# Patient Record
Sex: FEMALE | Race: WHITE | NOT HISPANIC OR LATINO
[De-identification: names, ages, dates, MRNs, and addresses within clinical notes are randomized per-mention and may not be internally consistent; named-entity substitution may affect disease eponyms.]

---

## 2017-03-28 PROBLEM — Z00.00 ENCOUNTER FOR PREVENTIVE HEALTH EXAMINATION: Status: ACTIVE | Noted: 2017-03-28

## 2018-06-28 ENCOUNTER — APPOINTMENT (OUTPATIENT)
Dept: ORTHOPEDIC SURGERY | Facility: CLINIC | Age: 83
End: 2018-06-28
Payer: MEDICARE

## 2018-06-28 VITALS — WEIGHT: 152 LBS | HEIGHT: 64 IN | BODY MASS INDEX: 25.95 KG/M2

## 2018-06-28 PROCEDURE — 99203 OFFICE O/P NEW LOW 30 MIN: CPT

## 2018-06-28 PROCEDURE — 73564 X-RAY EXAM KNEE 4 OR MORE: CPT | Mod: RT

## 2018-07-12 ENCOUNTER — APPOINTMENT (OUTPATIENT)
Dept: ORTHOPEDIC SURGERY | Facility: CLINIC | Age: 83
End: 2018-07-12
Payer: MEDICARE

## 2018-07-12 PROCEDURE — 99214 OFFICE O/P EST MOD 30 MIN: CPT

## 2018-07-12 PROCEDURE — 73564 X-RAY EXAM KNEE 4 OR MORE: CPT | Mod: RT

## 2018-08-06 ENCOUNTER — APPOINTMENT (OUTPATIENT)
Dept: ORTHOPEDIC SURGERY | Facility: CLINIC | Age: 83
End: 2018-08-06
Payer: MEDICARE

## 2018-08-06 PROCEDURE — 99214 OFFICE O/P EST MOD 30 MIN: CPT

## 2018-08-06 PROCEDURE — 73564 X-RAY EXAM KNEE 4 OR MORE: CPT | Mod: RT

## 2018-08-20 ENCOUNTER — APPOINTMENT (OUTPATIENT)
Dept: ORTHOPEDIC SURGERY | Facility: CLINIC | Age: 83
End: 2018-08-20
Payer: MEDICARE

## 2018-08-20 PROCEDURE — 99214 OFFICE O/P EST MOD 30 MIN: CPT

## 2018-08-20 PROCEDURE — 73564 X-RAY EXAM KNEE 4 OR MORE: CPT | Mod: RT

## 2018-10-01 ENCOUNTER — APPOINTMENT (OUTPATIENT)
Dept: ORTHOPEDIC SURGERY | Facility: CLINIC | Age: 83
End: 2018-10-01
Payer: MEDICARE

## 2018-10-01 DIAGNOSIS — Z78.9 OTHER SPECIFIED HEALTH STATUS: ICD-10-CM

## 2018-10-01 DIAGNOSIS — S82.001A UNSPECIFIED FRACTURE OF RIGHT PATELLA, INITIAL ENCOUNTER FOR CLOSED FRACTURE: ICD-10-CM

## 2018-10-01 PROCEDURE — 73564 X-RAY EXAM KNEE 4 OR MORE: CPT | Mod: RT

## 2018-10-01 PROCEDURE — 99214 OFFICE O/P EST MOD 30 MIN: CPT

## 2019-01-07 ENCOUNTER — APPOINTMENT (OUTPATIENT)
Dept: ORTHOPEDIC SURGERY | Facility: CLINIC | Age: 84
End: 2019-01-07

## 2019-07-15 ENCOUNTER — APPOINTMENT (OUTPATIENT)
Dept: ORTHOPEDIC SURGERY | Facility: CLINIC | Age: 84
End: 2019-07-15
Payer: MEDICARE

## 2019-07-15 VITALS — DIASTOLIC BLOOD PRESSURE: 79 MMHG | HEART RATE: 89 BPM | SYSTOLIC BLOOD PRESSURE: 126 MMHG

## 2019-07-15 DIAGNOSIS — M25.562 PAIN IN RIGHT KNEE: ICD-10-CM

## 2019-07-15 DIAGNOSIS — G89.29 PAIN IN RIGHT KNEE: ICD-10-CM

## 2019-07-15 DIAGNOSIS — M25.561 PAIN IN RIGHT KNEE: ICD-10-CM

## 2019-07-15 PROCEDURE — 20610 DRAIN/INJ JOINT/BURSA W/O US: CPT | Mod: 50

## 2019-07-15 PROCEDURE — 99214 OFFICE O/P EST MOD 30 MIN: CPT | Mod: 25

## 2019-07-15 PROCEDURE — 73564 X-RAY EXAM KNEE 4 OR MORE: CPT | Mod: 50

## 2019-07-15 NOTE — HISTORY OF PRESENT ILLNESS
[de-identified] : This is very nice 85 year year-old woman experiencing bilateral knee pain, right knee pain started with a fall in June 2018 when she sustained a paterllar fx, the left knee pain started 3 months ago.  Her pain is severe in intensity.  The pain is exacerbated by getting up from sitting or laying down and taking stairs.  She has not been taking any pain medications.  She has been tried using a Brace with minimal relief.  She has tried physical therapy without relief.  She has not been using an assistive device.  She has not had a prior injection in the knees.  She feels clicking.  She states that she feels the leg might give way.  The pain substantially limits activities of daily living. Walking tolerance is reduced. \par \par

## 2019-07-15 NOTE — PHYSICAL EXAM
[de-identified] : Patient is well nourished, well-developed, in no acute distress, with appropriate mood and affect. The patient is oriented to time, place, and person. Respirations are even and unlabored. Gait evaluation does reveal a limp. There is no inguinal adenopathy. Bilateral limbs are well-perfused, without skin lesions, shows a grossly normal motor and sensory examination. The right knee motion is significantly reduced and does cause significant pain. The right knee moves from 10 -125 degrees. The knee is stable within that range-of-motion to AP and ML stress. The alignment of the knee is 5 degrees valgus. Muscle strength is normal. Pedal pulses are palpable. Hip examination was negative. The left knee motion is significantly reduced and does cause significant pain. The left knee moves from 5 -125 degrees. The knee is stable within that range-of-motion to AP and ML stress. The alignment of the knee is 5 degrees valgus. Muscle strength is normal. Pedal pulses are palpable. Hip examination was negative. [de-identified] : Long standing knee, AP knee, lateral knee, and patellar views of the bilateral knee were ordered and taken in the office and demonstrate degenerative joint disease of the knee with joint space narrowing, osteophyte formation, and subchondral sclerosis.

## 2019-07-15 NOTE — DISCUSSION/SUMMARY
[de-identified] : This patient has bilateral knee osteoarthritis particularly in the patellofemoral compartment.  The patient is not an appropriate candidate for total knee arthroplasty at this time. An extensive discussion was conducted on the natural history of the disease and the variety of surgical and non-surgical options available to the patient including, but not limited to non-steroidal anti-inflammatory medications, steroid injections, physical therapy, maintenance of ideal body weight, and reduction of activity.  Today we performed bilateral knee intra-articular cortisone injections.  I recommended and prescribed a course of Mobic which she refused and physical therapy.  The patient is also encouraged to trial a neoprene sleeve knee brace which can be purchased OTC.  The patient will schedule an appointment as needed.\par \par Informed consent for the bilateral knee injection was obtained. All questions were answered. A time out was performed. The bilateral knee was prepped and draped in sterile fashion. Using sterile technique, the bilateral knee was injection of 1 cc of Kenalog, 4cc of 1% lidocaine, 2cc of 0.5% marcaine using a 21-gauge needle. A sterile dressing was applied. Post injection instructions were reviewed. The patient tolerated the procedure well.\par

## 2020-06-30 ENCOUNTER — NON-APPOINTMENT (OUTPATIENT)
Age: 85
End: 2020-06-30

## 2020-06-30 ENCOUNTER — APPOINTMENT (OUTPATIENT)
Dept: CARDIOLOGY | Facility: CLINIC | Age: 85
End: 2020-06-30
Payer: MEDICARE

## 2020-06-30 VITALS
HEART RATE: 68 BPM | HEIGHT: 64 IN | OXYGEN SATURATION: 98 % | BODY MASS INDEX: 26.63 KG/M2 | SYSTOLIC BLOOD PRESSURE: 130 MMHG | WEIGHT: 156 LBS | TEMPERATURE: 96.6 F | DIASTOLIC BLOOD PRESSURE: 80 MMHG

## 2020-06-30 DIAGNOSIS — Z86.39 PERSONAL HISTORY OF OTHER ENDOCRINE, NUTRITIONAL AND METABOLIC DISEASE: ICD-10-CM

## 2020-06-30 PROCEDURE — 93000 ELECTROCARDIOGRAM COMPLETE: CPT

## 2020-06-30 PROCEDURE — 93880 EXTRACRANIAL BILAT STUDY: CPT

## 2020-06-30 PROCEDURE — 93306 TTE W/DOPPLER COMPLETE: CPT

## 2020-06-30 PROCEDURE — 99205 OFFICE O/P NEW HI 60 MIN: CPT

## 2020-06-30 RX ORDER — SIMVASTATIN 80 MG/1
TABLET, FILM COATED ORAL
Refills: 0 | Status: DISCONTINUED | COMMUNITY
End: 2020-06-30

## 2020-06-30 RX ORDER — INSULIN GLARGINE 100 [IU]/ML
INJECTION, SOLUTION SUBCUTANEOUS
Refills: 0 | Status: ACTIVE | COMMUNITY

## 2020-06-30 RX ORDER — INSULIN GLARGINE 100 [IU]/ML
INJECTION, SOLUTION SUBCUTANEOUS
Refills: 0 | Status: DISCONTINUED | COMMUNITY
End: 2020-06-30

## 2020-06-30 RX ORDER — METOPROLOL SUCCINATE 50 MG/1
50 TABLET, EXTENDED RELEASE ORAL DAILY
Qty: 1 | Refills: 3 | Status: ACTIVE | COMMUNITY
Start: 2020-06-30

## 2020-06-30 NOTE — HISTORY OF PRESENT ILLNESS
[FreeTextEntry1] : She is a pleasant 86-year-old female with type 2 diabetes, Overweight by BMI, hypertension, dyslipidemia and comes to the office for cardiac evaluation. She's been having worsening bilateral ankle and leg edema as well as shortness of breath on exertion plus lightheadedness at times. Seen to have a left bundle branch block on ECG, she denies any current chest pains, palpitations or syncope. She does take her medications faithfully and reports eating healthy.

## 2020-06-30 NOTE — PHYSICAL EXAM
[Well Groomed] : well groomed [Normal Appearance] : normal appearance [General Appearance - Well Developed] : well developed [No Deformities] : no deformities [General Appearance - Well Nourished] : well nourished [General Appearance - In No Acute Distress] : no acute distress [Normal Conjunctiva] : the conjunctiva exhibited no abnormalities [Eyelids - No Xanthelasma] : the eyelids demonstrated no xanthelasmas [Normal Oral Mucosa] : normal oral mucosa [No Oral Pallor] : no oral pallor [No Oral Cyanosis] : no oral cyanosis [Normal Jugular Venous A Waves Present] : normal jugular venous A waves present [Normal Jugular Venous V Waves Present] : normal jugular venous V waves present [No Jugular Venous Douglas A Waves] : no jugular venous douglas A waves [Heart Rate And Rhythm] : heart rate and rhythm were normal [Heart Sounds] : normal S1 and S2 [Murmurs] : no murmurs present [1+] : left 1+ [Respiration, Rhythm And Depth] : normal respiratory rhythm and effort [Exaggerated Use Of Accessory Muscles For Inspiration] : no accessory muscle use [Auscultation Breath Sounds / Voice Sounds] : lungs were clear to auscultation bilaterally [Bowel Sounds] : normal bowel sounds [Abdomen Soft] : soft [Abdomen Tenderness] : non-tender [Abnormal Walk] : normal gait [Gait - Sufficient For Exercise Testing] : the gait was sufficient for exercise testing [Cyanosis, Localized] : no localized cyanosis [Nail Clubbing] : no clubbing of the fingernails [Petechial Hemorrhages (___cm)] : no petechial hemorrhages [Skin Color & Pigmentation] : normal skin color and pigmentation [No Venous Stasis] : no venous stasis [Skin Lesions] : no skin lesions [] : no rash [No Skin Ulcers] : no skin ulcer [No Xanthoma] : no  xanthoma was observed [Oriented To Time, Place, And Person] : oriented to person, place, and time [No Anxiety] : not feeling anxious [Affect] : the affect was normal [Mood] : the mood was normal [Right Carotid Bruit] : no bruit heard over the right carotid [Left Carotid Bruit] : no bruit heard over the left carotid [Bruit] : no bruit heard

## 2020-06-30 NOTE — REASON FOR VISIT
[Initial Evaluation] : an initial evaluation of [Abnormal ECG] : an abnormal ECG [Dizziness] : dizziness [Dyspnea] : dyspnea [Hyperlipidemia] : hyperlipidemia

## 2020-06-30 NOTE — DISCUSSION/SUMMARY
[___ Week(s)] : [unfilled] week(s) [FreeTextEntry3] : echo, carotid sono, pharma nuc stress test [FreeTextEntry1] : She will continue her Cozaar and metoprolol for antihypertensive management along with simvastatin for lipid-lowering plus Lantus glycemic control in the setting of type 2 diabetes. She may add Ecotrin 81 mg daily to her regimen for cardiovascular risk reduction and avoidance of other NSAIDs was further instructed. I'm prescribing Lasix 20 mg daily for the next week or so and then this could be made every other day if necessary for edema reduction and shortness of breath improvement. \par \par To further cardiac risk stratify, I have ordered an echocardiogram to assess LV function and valvular status.To better assess carotid disease burden and serve as a cardiovascular risk marker, especially with the possibility of a left carotid bruit and lightheadedness on exam, I've ordered a carotid bilateral duplex study.To assess underlying coronary disease burden with current cardiovascular disease risk factors and symptoms, I have ordered a lexiscan nuclear perfusion stress test. I recommended a heart healthy lifestyle including a low-saturated fat, low cholesterol diet with improved aerobic physical fitness over time for cardiovascular benefits. We will call the patient with test results and followup with you for general care. Followup in one month or sooner if needed.

## 2020-07-14 PROBLEM — Z86.39 HISTORY OF DIABETES MELLITUS: Status: RESOLVED | Noted: 2018-06-28 | Resolved: 2020-07-14

## 2020-07-21 ENCOUNTER — MED ADMIN CHARGE (OUTPATIENT)
Age: 85
End: 2020-07-21

## 2020-07-21 ENCOUNTER — APPOINTMENT (OUTPATIENT)
Dept: CARDIOLOGY | Facility: CLINIC | Age: 85
End: 2020-07-21
Payer: MEDICARE

## 2020-07-21 PROCEDURE — 78452 HT MUSCLE IMAGE SPECT MULT: CPT

## 2020-07-21 PROCEDURE — 93015 CV STRESS TEST SUPVJ I&R: CPT

## 2020-07-21 PROCEDURE — A9500: CPT

## 2020-07-21 RX ORDER — REGADENOSON 0.08 MG/ML
0.4 INJECTION, SOLUTION INTRAVENOUS
Qty: 1 | Refills: 0 | Status: COMPLETED | OUTPATIENT
Start: 2020-07-21

## 2020-07-21 RX ADMIN — REGADENOSON 4 MG/5ML: 0.08 INJECTION, SOLUTION INTRAVENOUS at 00:00

## 2020-07-28 ENCOUNTER — APPOINTMENT (OUTPATIENT)
Dept: CARDIOLOGY | Facility: CLINIC | Age: 85
End: 2020-07-28

## 2020-08-24 ENCOUNTER — RX CHANGE (OUTPATIENT)
Age: 85
End: 2020-08-24

## 2021-07-03 ENCOUNTER — INPATIENT (INPATIENT)
Facility: HOSPITAL | Age: 86
LOS: 9 days | Discharge: HOME | End: 2021-07-13
Attending: INTERNAL MEDICINE | Admitting: INTERNAL MEDICINE
Payer: MEDICARE

## 2021-07-03 VITALS
OXYGEN SATURATION: 98 % | HEART RATE: 67 BPM | RESPIRATION RATE: 16 BRPM | HEIGHT: 64 IN | SYSTOLIC BLOOD PRESSURE: 192 MMHG | WEIGHT: 154.1 LBS | DIASTOLIC BLOOD PRESSURE: 81 MMHG | TEMPERATURE: 98 F

## 2021-07-03 DIAGNOSIS — I10 ESSENTIAL (PRIMARY) HYPERTENSION: ICD-10-CM

## 2021-07-03 DIAGNOSIS — R26.81 UNSTEADINESS ON FEET: ICD-10-CM

## 2021-07-03 DIAGNOSIS — I44.7 LEFT BUNDLE-BRANCH BLOCK, UNSPECIFIED: ICD-10-CM

## 2021-07-03 DIAGNOSIS — R42 DIZZINESS AND GIDDINESS: ICD-10-CM

## 2021-07-03 LAB
ALBUMIN SERPL ELPH-MCNC: 3.4 G/DL — SIGNIFICANT CHANGE UP (ref 3.3–5)
ALP SERPL-CCNC: 96 U/L — SIGNIFICANT CHANGE UP (ref 40–120)
ALT FLD-CCNC: 21 U/L — SIGNIFICANT CHANGE UP (ref 12–78)
ANION GAP SERPL CALC-SCNC: 4 MMOL/L — LOW (ref 5–17)
AST SERPL-CCNC: 21 U/L — SIGNIFICANT CHANGE UP (ref 15–37)
BASOPHILS # BLD AUTO: 0.05 K/UL — SIGNIFICANT CHANGE UP (ref 0–0.2)
BASOPHILS NFR BLD AUTO: 0.6 % — SIGNIFICANT CHANGE UP (ref 0–2)
BILIRUB SERPL-MCNC: 0.5 MG/DL — SIGNIFICANT CHANGE UP (ref 0.2–1.2)
BUN SERPL-MCNC: 14 MG/DL — SIGNIFICANT CHANGE UP (ref 7–23)
CALCIUM SERPL-MCNC: 8.9 MG/DL — SIGNIFICANT CHANGE UP (ref 8.5–10.1)
CHLORIDE SERPL-SCNC: 107 MMOL/L — SIGNIFICANT CHANGE UP (ref 96–108)
CO2 SERPL-SCNC: 28 MMOL/L — SIGNIFICANT CHANGE UP (ref 22–31)
CREAT SERPL-MCNC: 0.72 MG/DL — SIGNIFICANT CHANGE UP (ref 0.5–1.3)
EOSINOPHIL # BLD AUTO: 0.28 K/UL — SIGNIFICANT CHANGE UP (ref 0–0.5)
EOSINOPHIL NFR BLD AUTO: 3.6 % — SIGNIFICANT CHANGE UP (ref 0–6)
ERYTHROCYTE [SEDIMENTATION RATE] IN BLOOD: 18 MM/HR — SIGNIFICANT CHANGE UP (ref 0–20)
FLUAV AG NPH QL: SIGNIFICANT CHANGE UP
FLUBV AG NPH QL: SIGNIFICANT CHANGE UP
GLUCOSE BLDC GLUCOMTR-MCNC: 149 MG/DL — HIGH (ref 70–99)
GLUCOSE BLDC GLUCOMTR-MCNC: 155 MG/DL — HIGH (ref 70–99)
GLUCOSE SERPL-MCNC: 132 MG/DL — HIGH (ref 70–99)
HCT VFR BLD CALC: 42.3 % — SIGNIFICANT CHANGE UP (ref 34.5–45)
HGB BLD-MCNC: 14.2 G/DL — SIGNIFICANT CHANGE UP (ref 11.5–15.5)
IMM GRANULOCYTES NFR BLD AUTO: 0.3 % — SIGNIFICANT CHANGE UP (ref 0–1.5)
LYMPHOCYTES # BLD AUTO: 1.67 K/UL — SIGNIFICANT CHANGE UP (ref 1–3.3)
LYMPHOCYTES # BLD AUTO: 21.2 % — SIGNIFICANT CHANGE UP (ref 13–44)
MCHC RBC-ENTMCNC: 31.3 PG — SIGNIFICANT CHANGE UP (ref 27–34)
MCHC RBC-ENTMCNC: 33.6 GM/DL — SIGNIFICANT CHANGE UP (ref 32–36)
MCV RBC AUTO: 93.2 FL — SIGNIFICANT CHANGE UP (ref 80–100)
MONOCYTES # BLD AUTO: 0.68 K/UL — SIGNIFICANT CHANGE UP (ref 0–0.9)
MONOCYTES NFR BLD AUTO: 8.6 % — SIGNIFICANT CHANGE UP (ref 2–14)
NEUTROPHILS # BLD AUTO: 5.18 K/UL — SIGNIFICANT CHANGE UP (ref 1.8–7.4)
NEUTROPHILS NFR BLD AUTO: 65.7 % — SIGNIFICANT CHANGE UP (ref 43–77)
NRBC # BLD: 0 /100 WBCS — SIGNIFICANT CHANGE UP (ref 0–0)
NT-PROBNP SERPL-SCNC: 240 PG/ML — SIGNIFICANT CHANGE UP (ref 0–450)
PLATELET # BLD AUTO: 234 K/UL — SIGNIFICANT CHANGE UP (ref 150–400)
POTASSIUM SERPL-MCNC: 4.7 MMOL/L — SIGNIFICANT CHANGE UP (ref 3.5–5.3)
POTASSIUM SERPL-SCNC: 4.7 MMOL/L — SIGNIFICANT CHANGE UP (ref 3.5–5.3)
PROT SERPL-MCNC: 7 GM/DL — SIGNIFICANT CHANGE UP (ref 6–8.3)
RBC # BLD: 4.54 M/UL — SIGNIFICANT CHANGE UP (ref 3.8–5.2)
RBC # FLD: 12.9 % — SIGNIFICANT CHANGE UP (ref 10.3–14.5)
SARS-COV-2 RNA SPEC QL NAA+PROBE: SIGNIFICANT CHANGE UP
SODIUM SERPL-SCNC: 139 MMOL/L — SIGNIFICANT CHANGE UP (ref 135–145)
TROPONIN I SERPL-MCNC: <.015 NG/ML — SIGNIFICANT CHANGE UP (ref 0.01–0.04)
TSH SERPL-MCNC: 2.35 UIU/ML — SIGNIFICANT CHANGE UP (ref 0.36–3.74)
WBC # BLD: 7.88 K/UL — SIGNIFICANT CHANGE UP (ref 3.8–10.5)
WBC # FLD AUTO: 7.88 K/UL — SIGNIFICANT CHANGE UP (ref 3.8–10.5)

## 2021-07-03 PROCEDURE — 70450 CT HEAD/BRAIN W/O DYE: CPT | Mod: 26,MA

## 2021-07-03 PROCEDURE — 71045 X-RAY EXAM CHEST 1 VIEW: CPT | Mod: 26

## 2021-07-03 PROCEDURE — 93010 ELECTROCARDIOGRAM REPORT: CPT

## 2021-07-03 PROCEDURE — 99285 EMERGENCY DEPT VISIT HI MDM: CPT

## 2021-07-03 RX ORDER — HEPARIN SODIUM 5000 [USP'U]/ML
5000 INJECTION INTRAVENOUS; SUBCUTANEOUS EVERY 12 HOURS
Refills: 0 | Status: DISCONTINUED | OUTPATIENT
Start: 2021-07-03 | End: 2021-07-13

## 2021-07-03 RX ORDER — LOSARTAN POTASSIUM 100 MG/1
25 TABLET, FILM COATED ORAL DAILY
Refills: 0 | Status: DISCONTINUED | OUTPATIENT
Start: 2021-07-03 | End: 2021-07-04

## 2021-07-03 RX ORDER — MECLIZINE HCL 12.5 MG
25 TABLET ORAL THREE TIMES A DAY
Refills: 0 | Status: DISCONTINUED | OUTPATIENT
Start: 2021-07-03 | End: 2021-07-13

## 2021-07-03 RX ORDER — MECLIZINE HCL 12.5 MG
25 TABLET ORAL ONCE
Refills: 0 | Status: COMPLETED | OUTPATIENT
Start: 2021-07-03 | End: 2021-07-03

## 2021-07-03 RX ORDER — ONDANSETRON 8 MG/1
4 TABLET, FILM COATED ORAL ONCE
Refills: 0 | Status: COMPLETED | OUTPATIENT
Start: 2021-07-03 | End: 2021-07-03

## 2021-07-03 RX ADMIN — HEPARIN SODIUM 5000 UNIT(S): 5000 INJECTION INTRAVENOUS; SUBCUTANEOUS at 18:19

## 2021-07-03 RX ADMIN — Medication 25 MILLIGRAM(S): at 16:26

## 2021-07-03 RX ADMIN — ONDANSETRON 4 MILLIGRAM(S): 8 TABLET, FILM COATED ORAL at 16:26

## 2021-07-03 RX ADMIN — Medication 25 MILLIGRAM(S): at 21:32

## 2021-07-03 RX ADMIN — LOSARTAN POTASSIUM 25 MILLIGRAM(S): 100 TABLET, FILM COATED ORAL at 18:19

## 2021-07-03 NOTE — H&P ADULT - NSHPLABSRESULTS_GEN_ALL_CORE
LABS:                        14.2   7.88  )-----------( 234      ( 03 Jul 2021 14:08 )             42.3     07-03    139  |  107  |  14  ----------------------------<  132<H>  4.7   |  28  |  0.72    Ca    8.9      03 Jul 2021 14:08    TPro  7.0  /  Alb  3.4  /  TBili  0.5  /  DBili  x   /  AST  21  /  ALT  21  /  AlkPhos  96  07-03        EKG  NSR  LBB

## 2021-07-03 NOTE — ED ADULT NURSE NOTE - BEFAST BALANCE
INR on 2/24 was 1.8    He was told to take 6 mg that night then take 4 mg nightly.    Rechecked on 3/3 and his INR was 3.8    He held for 2 days then was hesitant to take 5 mg nightly as when he was taking 4 mg nightly his INR went up to 3.8.    He only took 4 mg nightly yesterday.    He is not comfortable taking 5 mg. Please advise.                     No

## 2021-07-03 NOTE — ED PROVIDER NOTE - MDM ORDERS SUBMITTED SELECTION
Airway patent, Nasal mucosa clear. Mouth with normal mucosa. Throat has no vesicles, no oropharyngeal exudates and uvula is midline. MM Moist. neck supple. no meningeal signs. Labs/EKG/Imaging Studies/Medications

## 2021-07-03 NOTE — ED PROVIDER NOTE - PHYSICAL EXAMINATION
Gen: no acute distress, well appearing, awake, alert and oriented x 3  Cardiac: regular rate and rhythm, +S1S2  Pulm: Clear to auscultation bilaterally  Abd: soft, nontender, nondistended, no guarding  Back: neg CVA ttp, nontender spine  Extremity: no edema, no deformity, warm and well perfused, FROM all extremities    Neuro: awake, alert, oriented x 3, sensorimotor intact, +dizziness reports with head movement,

## 2021-07-03 NOTE — H&P ADULT - HISTORY OF PRESENT ILLNESS
· HPI Objective Statement: Pt is an 87 year old female w/PMH DM, HTN, HLD, who presents to the ED today for evaluation of dizziness over past several weeks with acute lightheadedness that started last night associated with vomiting and difficulty with ambulation. Denies CP fever chills, cough, injuries or trauma. Has chronic SOB that is unchanged, denies new leg swelling or pain. Denies recent travel. no PSHx, no allergies, full vaccinated for COVID on March 16th.  has  had  symptoms c/w  vertigo  for  8  months  has  seen  ENT  underwent  "maneuvers  "  in  ENT  office  without  improvement.  had  cardiac  eval 10  months   ago  for  evaluation of  manning  w/u  reported  negative  asper pt's  daughter  found  to  have  LBB  on  EKG  insure  if  new

## 2021-07-03 NOTE — H&P ADULT - ASSESSMENT
IMPROVE VTE Individual Risk Assessment    RISK                                                                Points    [  ] Previous VTE                                                  3    [  ] Thrombophilia                                               2    [  ] Lower limb paralysis                                      2        (unable to hold up >15 seconds)      [  ] Current Cancer                                              2         (within 6 months)    [ X ] Immobilization > 24 hrs                                1    [  ] ICU/CCU stay > 24 hours                              1    [ X ] Age > 60                                                      1    IMPROVE VTE Score _____2____    IMPROVE Score 0-1: Low Risk, No VTE prophylaxis required for most patients, encourage ambulation.   IMPROVE Score 2-3: At risk, pharmacologic VTE prophylaxis is indicated for most patients (in the absence of a contraindication)  IMPROVE Score > or = 4: High Risk, pharmacologic VTE prophylaxis is indicated for most patients (in the absence of a contraindication)

## 2021-07-03 NOTE — ED PROVIDER NOTE - CLINICAL SUMMARY MEDICAL DECISION MAKING FREE TEXT BOX
87 year old female with acutely worsened dizziness with nausea, vomit, and difficulty with ambulation. Will check labs, imaging EKG, admit.

## 2021-07-03 NOTE — ED ADULT NURSE NOTE - OBJECTIVE STATEMENT
patient A&Ox3 in no acute distress c/o of dizziness " for weeks " patient denied headache denied chest pain denied difficulty breathing at this time , c/o of abdominal pain N/V patient stated " I'm very upset I have some family problems " c/o of depression denied Si/HI , patient moving all extremities no facial droop clear speech at the time of triage , place on  continue to monitor

## 2021-07-03 NOTE — ED PROVIDER NOTE - OBJECTIVE STATEMENT
Pt is an 87 year old female w/PMH DM, HTN, HLD, who presents to the ED today for evaluation of dizziness over past several weeks with acute lightheadedness that started last night associated with vomiting and difficulty with ambulation. Denies CP fever chills, cough, injuries or trauma. Has chronic SOB that is unchanged, denies new leg swelling or pain. Denies recent travel. Denies hx of PE or DVT. Denies hormone replacement therapy. Not on anticoagulation. PMD Timpone, no PSHx, no allergies, full vaccinated for COVID on March 16th.

## 2021-07-03 NOTE — ED ADULT TRIAGE NOTE - CHIEF COMPLAINT QUOTE
brought by ems for dizziness and high blood pressure .pt is also c/o abdominal and nausea and vomiting .

## 2021-07-03 NOTE — CONSULT NOTE ADULT - ASSESSMENT
Subjective Complaints:      Consult requested by ER doctor:                  Attending:     History of Present Illness:  Chief Complaint/Reason for Admission:  History of Present Illness:  HPI:  · HPI Objective Statement: Pt is an 87 year old female w/PMH DM, HTN, HLD, who presents to the ED today for evaluation of dizziness over past several weeks with acute lightheadedness that started last night associated with vomiting and difficulty with ambulation. Denies CP fever chills, cough, injuries or trauma. Has chronic SOB that is unchanged, denies new leg swelling or pain. Denies recent travel. no PSHx, no allergies, full vaccinated for COVID on March 16th.  has  had  symptoms c/w  vertigo  for  8  months  has  seen  ENT  underwent  "maneuvers  "  in  ENT  office  without  improvement.  had  cardiac  eval 10  months   ago  for  evaluation of  manning  w/u  reported  negative  asper pt's  daughter  found  to  have  LBB  on  EKG  insure  if  new    (03 Jul 2021 17:37)        PAST MEDICAL & SURGICAL HISTORY:  HTN (hypertension)    87yFemale    MEDICATIONS  (STANDING):  heparin SubCutaneous Injection - Peds 5000 Unit(s) SubCutaneous every 12 hours  losartan 25 milliGRAM(s) Oral daily  meclizine 25 milliGRAM(s) Oral three times a day    MEDICATIONS  (PRN):      Allergies    No Known Allergies    Intolerances      FAMILY HISTORY:      REVIEW OF SYSTEMS:  General:  No wt loss, fevers, chills, night sweats  Eyes:  Good vision, no reported pain  ENT:  No sore throat, pain, runny nose, dysphagia  CV:  No pain, palpitatioins, hypo/hypertension  Resp:  No dyspnea, cough, tachypnea, wheezing  GI:  No pain, nausea, vomiting, diarrhea, constipatiion  :  No pain, bleeding, incontinence, nocturia  Muscle:  No pain, weakness  Breast:  No pain, abscess, mass, discharge  Neuro:  No weakness, tingling, memory problems  Psych:  No fatigue, insomnia, mood problems, depression  Endocrine:  No polyuria, polydypsia, cold/heat intolerance  Heme:  No petechiae, ecchymosis, easy bruisability  Skin:  No rash, tattoos, scars, edema      Vital Signs Last 24 Hrs  T(C): 36.9 (03 Jul 2021 20:47), Max: 37.2 (03 Jul 2021 19:55)  T(F): 98.4 (03 Jul 2021 20:47), Max: 98.9 (03 Jul 2021 19:55)  HR: 69 (03 Jul 2021 20:47) (65 - 72)  BP: 158/62 (03 Jul 2021 20:47) (147/67 - 192/81)  BP(mean): --  RR: 16 (03 Jul 2021 20:47) (14 - 17)  SpO2: 95% (03 Jul 2021 20:47) (92% - 98%)    GENERAL PHYSICAL EXAM:  General:  Appears stated age, well-groomed, well-nourished, no distress  HEENT:  NC/AT, patent nares w/ pink mucosa, OP clear w/o lesions, PERRL, EOMI, conjunctivae clear, no thyromegaly, nodules, adenopathy, no JVD  Chest:  Full & symmetric excursion, no increased effort, breath sounds clear  Cardiovascular:  Regular rhythm, S1, S2, no murmur/rub/S3/S4, no carotid/femoral/abdominal bruit, radial/pedal pulses 2+, no edema  Abdomen:  Soft, non-tender, non-distended, normoactive bowel sounds, no HSM  Extremities:  Gait & station:   Digits:   Nails:   Joints, Bones, Muscles:   ROM:   Stability:  Skin:  No rash/erythema/ecchymoses/petechiae/wounds/abscess/warm/dry  Musculoskeletal:  Full ROM in all joints w/o swelling/tenderness/effusion    NEUROLOGICAL EXAM:  HENT:  Normocephalic head; atraumatic head.  Neck supple.  ENT: normal looking.  Mental State:    Alert.  Fully oriented to person, place and date.  Coherent.  Speech clear and intact.  Cooperative.  Responds appropriately.    Cranial Nerves:  II-XII:   Pupils round and reactive to light and accommodation.  Extraocular movements full.  Visual fields full (no homonymous hemianopsia).  Visual acuity wnl.  Facial symmetry intact.  Tongue midline.  Motor Functions:  Moves all extremities.  No pronator drift of UE.  Claps hand well.  Hand  intact bilaterally.  Ambulatory.    Sensory Functions:   Intact to touch and pinprick to face and extremities.    Reflexes:  Deep tendon reflexes normoactive to biceps, knees and ankles.  Babinski absent (present).  Cerebellar Testing:    Finger to nose intact.  Nystagmus absent.  Neurovascular: Carotid auscultation full without bruits.      LABS:                        14.2   7.88  )-----------( 234      ( 03 Jul 2021 14:08 )             42.3     07-03    139  |  107  |  14  ----------------------------<  132<H>  4.7   |  28  |  0.72    Ca    8.9      03 Jul 2021 14:08    TPro  7.0  /  Alb  3.4  /  TBili  0.5  /  DBili  x   /  AST  21  /  ALT  21  /  AlkPhos  96  07-03            RADIOLOGY & ADDITIONAL STUDIES:      Assessment & Opinion: events noted dizziness ct head no acute path  awaek alert sp[eech fluent follows commands arm leg 4/5 sensory intact for mri michelle echo tsh b12 asa 81 mg  will follow     Recommendations:  Brain MRI.  Carotid doppler.  Echocardiogram.  EEG.   DVT prophylaxis as ordered.  Medications:

## 2021-07-04 LAB
COVID-19 SPIKE DOMAIN AB INTERP: POSITIVE
COVID-19 SPIKE DOMAIN ANTIBODY RESULT: >250 U/ML — HIGH
CRP SERPL-MCNC: <3 MG/L — SIGNIFICANT CHANGE UP
SARS-COV-2 IGG+IGM SERPL QL IA: >250 U/ML — HIGH
SARS-COV-2 IGG+IGM SERPL QL IA: POSITIVE
T3 SERPL-MCNC: 112 NG/DL — SIGNIFICANT CHANGE UP (ref 80–200)
T4 AB SER-ACNC: 5.9 UG/DL — SIGNIFICANT CHANGE UP (ref 4.6–12)

## 2021-07-04 PROCEDURE — 99223 1ST HOSP IP/OBS HIGH 75: CPT

## 2021-07-04 RX ORDER — LOSARTAN POTASSIUM 100 MG/1
25 TABLET, FILM COATED ORAL ONCE
Refills: 0 | Status: COMPLETED | OUTPATIENT
Start: 2021-07-04 | End: 2021-07-04

## 2021-07-04 RX ORDER — LOSARTAN POTASSIUM 100 MG/1
50 TABLET, FILM COATED ORAL DAILY
Refills: 0 | Status: DISCONTINUED | OUTPATIENT
Start: 2021-07-05 | End: 2021-07-05

## 2021-07-04 RX ADMIN — HEPARIN SODIUM 5000 UNIT(S): 5000 INJECTION INTRAVENOUS; SUBCUTANEOUS at 05:46

## 2021-07-04 RX ADMIN — LOSARTAN POTASSIUM 25 MILLIGRAM(S): 100 TABLET, FILM COATED ORAL at 20:11

## 2021-07-04 RX ADMIN — Medication 25 MILLIGRAM(S): at 05:45

## 2021-07-04 RX ADMIN — HEPARIN SODIUM 5000 UNIT(S): 5000 INJECTION INTRAVENOUS; SUBCUTANEOUS at 17:27

## 2021-07-04 RX ADMIN — Medication 25 MILLIGRAM(S): at 21:22

## 2021-07-04 RX ADMIN — LOSARTAN POTASSIUM 25 MILLIGRAM(S): 100 TABLET, FILM COATED ORAL at 05:45

## 2021-07-04 RX ADMIN — Medication 25 MILLIGRAM(S): at 13:46

## 2021-07-04 NOTE — CONSULT NOTE ADULT - SUBJECTIVE AND OBJECTIVE BOX
Cardiology Initial Consult    [facility] - Cardiology at [place]  [address] [place] [address], [place] [address]  Office: [phone]  Fax: [phone]    CHIEF COMPLAINT:      HISTORY OF PRESENT ILLNESS:  87yFemale    Allergies    No Known Allergies    Intolerances    	    MEDICATIONS:  heparin SubCutaneous Injection - Peds 5000 Unit(s) SubCutaneous every 12 hours  losartan 25 milliGRAM(s) Oral daily        meclizine 25 milliGRAM(s) Oral three times a day            PAST MEDICAL & SURGICAL HISTORY:  HTN (hypertension)    Diabetes        FAMILY HISTORY:      SOCIAL HISTORY:    [ ] Non-smoker  [ ] Smoker  [ ] Alcohol    Review of Systems:  Constitutional: [ ] Fever [ ] Chills [ ] Fatigue [ ] Weight change   HEENT: [ ] Blurred vision [ ] Eye pain [ ] Headache [ ] Runny nose [ ] Sore throat   Respiratory: [ ] Cough [ ] Wheezing [ ] Shortness of breath  Cardiovascular: [ ] Chest Pain [ ] Palpitations [ ] WESTON [ ] PND [ ] Orthopnea  Gastrointestinal: [ ] Abdominal Pain [ ] Diarrhea [ ] Constipation [ ] Hemorrhoids [ ] Nausea [ ] Vomiting  Genitourinary: [ ] Nocturia [ ] Dysuria [ ] Incontinence  Extremities: [ ] Swelling [ ] Joint Pain  Neurologic: [ ] Focal deficit [ ] Paresthesias [ ] Syncope  Skin: [ ] Rash [ ] Ecchymoses [ ] Wounds [ ] Lesions  Psychiatry: [ ] Depression [ ] Suicidal/Homicidal ideation [ ] Anxiety [ ] Sleep disturbances  [ ] 10 point review of systems is otherwise negative except as mentioned above            [ ]Unable to obtain    PHYSICAL EXAM:  T(C): 36.4 (07-04-21 @ 05:32), Max: 37.2 (07-03-21 @ 19:55)  HR: 62 (07-04-21 @ 05:32) (62 - 72)  BP: 113/68 (07-04-21 @ 05:32) (113/68 - 192/81)  RR: 18 (07-04-21 @ 05:32) (14 - 18)  SpO2: 95% (07-04-21 @ 05:32) (92% - 98%)  Wt(kg): --  I&O's Summary      Appearance: No acute distress  HEENT:   Normal oral mucosa, PERRL  Cardiovascular: Normal S1 S2, no elevated JVP, no murmurs, no edema  Respiratory: Lungs clear to auscultation	, good air movement  Psychiatry: A & O x 3, Mood & affect appropriate  Gastrointestinal:  soft nt nd normoactive bs	  Skin: No rashes, no ecchymoses, no cyanosis	  Neurologic: grossly non-focal  Extremities: Normal range of motion, no clubbing, cyanosis or edema  Vascular: Peripheral pulses palpable bilaterally    LABS:	 	  CBC Full  -  ( 03 Jul 2021 14:08 )  WBC Count : 7.88 K/uL  Hemoglobin : 14.2 g/dL  Hematocrit : 42.3 %  Platelet Count - Automated : 234 K/uL  Mean Cell Volume : 93.2 fl  Mean Cell Hemoglobin : 31.3 pg  Mean Cell Hemoglobin Concentration : 33.6 gm/dL  Auto Neutrophil # : 5.18 K/uL  Auto Lymphocyte # : 1.67 K/uL  Auto Monocyte # : 0.68 K/uL  Auto Eosinophil # : 0.28 K/uL  Auto Basophil # : 0.05 K/uL  Auto Neutrophil % : 65.7 %  Auto Lymphocyte % : 21.2 %  Auto Monocyte % : 8.6 %  Auto Eosinophil % : 3.6 %  Auto Basophil % : 0.6 %    07-03    139  |  107  |  14  ----------------------------<  132<H>  4.7   |  28  |  0.72    Ca    8.9      03 Jul 2021 14:08    TPro  7.0  /  Alb  3.4  /  TBili  0.5  /  DBili  x   /  AST  21  /  ALT  21  /  AlkPhos  96  07-03      proBNP: Serum Pro-Brain Natriuretic Peptide: 240 pg/mL (07-03 @ 14:08)    Lipid Profile:   HgA1c:   TSH: Thyroid Stimulating Hormone, Serum: 2.350 uIU/mL (07-03 @ 20:07)      CARDIAC MARKERS:  Troponin I, Serum: <.015 ng/mL (07-03 @ 14:08)              TELEMETRY: 	    ECG:  	  RADIOLOGY:  OTHER: 	    PREVIOUS DIAGNOSTIC TESTING:    [ ] Echocardiogram:   [ ] Catheterization:   [ ] Stress Test:  	 Cardiology Initial Consult    [facility] - Cardiology at [place]  [address] [place] [address], [place] [address]  Office: [phone]  Fax: [phone]    CHIEF COMPLAINT:  dizziness    HISTORY OF PRESENT ILLNESS:  Cardiologist: Dr. Dany Grace  87F HTN, HLD, DM who presented with several weeks of dizziness with acute episode of lightheadedness associated with vomiting and difficulty ambulating. Chronic SOB which has been documented in the past and has not changed in character.          Allergies  No Known Allergies  Intolerances    	  MEDICATIONS:  heparin SubCutaneous Injection - Peds 5000 Unit(s) SubCutaneous every 12 hours  losartan 25 milliGRAM(s) Oral daily  meclizine 25 milliGRAM(s) Oral three times a day    PAST MEDICAL & SURGICAL HISTORY:  HTN (hypertension)  Diabetes      FAMILY HISTORY:      SOCIAL HISTORY:    [x] Non-smoker  [ ] Smoker  [ ] Alcohol    Review of Systems:  Constitutional: [ ] Fever [ ] Chills [ ] Fatigue [ ] Weight change   HEENT: [ ] Blurred vision [ ] Eye pain [ ] Headache [ ] Runny nose [ ] Sore throat   Respiratory: [ ] Cough [ ] Wheezing [ ] Shortness of breath  Cardiovascular: [ ] Chest Pain [ ] Palpitations [ ] WESTON [ ] PND [ ] Orthopnea  Gastrointestinal: [ ] Abdominal Pain [ ] Diarrhea [ ] Constipation [ ] Hemorrhoids [ ] Nausea [ ] Vomiting  Genitourinary: [ ] Nocturia [ ] Dysuria [ ] Incontinence  Extremities: [ ] Swelling [ ] Joint Pain  Neurologic: [ ] Focal deficit [ ] Paresthesias [ ] Syncope  Skin: [ ] Rash [ ] Ecchymoses [ ] Wounds [ ] Lesions  Psychiatry: [ ] Depression [ ] Suicidal/Homicidal ideation [ ] Anxiety [ ] Sleep disturbances  [ ] 10 point review of systems is otherwise negative except as mentioned above            [ ]Unable to obtain    PHYSICAL EXAM:  T(C): 36.4 (07-04-21 @ 05:32), Max: 37.2 (07-03-21 @ 19:55)  HR: 62 (07-04-21 @ 05:32) (62 - 72)  BP: 113/68 (07-04-21 @ 05:32) (113/68 - 192/81)  RR: 18 (07-04-21 @ 05:32) (14 - 18)  SpO2: 95% (07-04-21 @ 05:32) (92% - 98%)  Wt(kg): --  I&O's Summary      Appearance: No acute distress  HEENT:   Normal oral mucosa, PERRL  Cardiovascular: Normal S1 S2, no elevated JVP, no murmurs, no edema  Respiratory: Lungs clear to auscultation	, good air movement  Psychiatry: A & O x 3, Mood & affect appropriate  Gastrointestinal:  soft nt nd normoactive bs	  Skin: No rashes, no ecchymoses, no cyanosis	  Neurologic: grossly non-focal  Extremities: Normal range of motion, no clubbing, cyanosis or edema  Vascular: Peripheral pulses palpable bilaterally    LABS:	 	  CBC Full  -  ( 03 Jul 2021 14:08 )  WBC Count : 7.88 K/uL  Hemoglobin : 14.2 g/dL  Hematocrit : 42.3 %  Platelet Count - Automated : 234 K/uL    07-03  139  |  107  |  14  ----------------------------<  132<H>  4.7   |  28  |  0.72    Ca    8.9      03 Jul 2021 14:08    TPro  7.0  /  Alb  3.4  /  TBili  0.5  /  DBili  x   /  AST  21  /  ALT  21  /  AlkPhos  96  07-03  proBNP: Serum Pro-Brain Natriuretic Peptide: 240 pg/mL (07-03 @ 14:08)    Lipid Profile:   HgA1c:   TSH: Thyroid Stimulating Hormone, Serum: 2.350 uIU/mL (07-03 @ 20:07)    CARDIAC MARKERS:  Troponin I, Serum: <.015 ng/mL (07-03 @ 14:08)      TELEMETRY: 	    ECG:  	  RADIOLOGY:  6/30/2020: carotid duplex without hemodynamically significant stenosis  OTHER: 	    PREVIOUS DIAGNOSTIC TESTING:    [x] Echocardiogram: 6/30/2020:  EF 65%, DD1, mildly restrictive MV leaflets  [ ] Catheterization:   [x] Stress Test:  	7/21/2020: normal MPI with vasodilator stress testing Cardiology Initial Consult    [facility] - Cardiology at [place]  [address] [place] [address], [place] [address]  Office: [phone]  Fax: [phone]    CHIEF COMPLAINT:  dizziness    HISTORY OF PRESENT ILLNESS:  Cardiologist: Dr. Dany Grace  87F HTN, HLD, DM who presented with several weeks of dizziness with acute episode of lightheadedness associated with vomiting and difficulty ambulating. Chronic SOB which has been documented in the past and has not changed in character.    Denies any chest pain, orthopnea, new LE edema,          Allergies  No Known Allergies  Intolerances    	  MEDICATIONS:  heparin SubCutaneous Injection - Peds 5000 Unit(s) SubCutaneous every 12 hours  losartan 25 milliGRAM(s) Oral daily  meclizine 25 milliGRAM(s) Oral three times a day    PAST MEDICAL & SURGICAL HISTORY:  HTN (hypertension)  Diabetes    FAMILY HISTORY:    SOCIAL HISTORY:    [x] Non-smoker  [ ] Smoker  [ ] Alcohol    Review of Systems:  Constitutional: [- ] Fever [- ] Chills [ ] Fatigue [ ] Weight change   HEENT: [ ] Blurred vision [- ] Eye pain [ ] Headache [ ] Runny nose [ ] Sore throat   Respiratory: [ ] Cough [ ] Wheezing [ -] Shortness of breath  Cardiovascular: [- ] Chest Pain [ -] Palpitations [ ] WESTON [ ] PND [ ] Orthopnea  Gastrointestinal: [- ] Abdominal Pain [ ] Diarrhea [ ] Constipation [ ] Hemorrhoids [ x] Nausea [x ] Vomiting  Genitourinary: [ ] Nocturia [- ] Dysuria [ ] Incontinence  Extremities: [- ] Swelling [ ] Joint Pain  Neurologic: [- ] Focal deficit [ ] Paresthesias [ ] Syncope  Skin: [- ] Rash [ ] Ecchymoses [ ] Wounds [ ] Lesions  Psychiatry: [ -] Depression [ ] Suicidal/Homicidal ideation [ ] Anxiety [ ] Sleep disturbances  [x ] 10 point review of systems is otherwise negative except as mentioned above            [ ]Unable to obtain    PHYSICAL EXAM:  T(C): 36.4 (07-04-21 @ 05:32), Max: 37.2 (07-03-21 @ 19:55)  HR: 62 (07-04-21 @ 05:32) (62 - 72)  BP: 113/68 (07-04-21 @ 05:32) (113/68 - 192/81)  RR: 18 (07-04-21 @ 05:32) (14 - 18)  SpO2: 95% (07-04-21 @ 05:32) (92% - 98%)  Wt(kg): --  I&O's Summary      Appearance: No acute distress  HEENT:   Normal oral mucosa, PERRL  Cardiovascular: Normal S1 S2, no elevated JVP, no murmurs, no edema  Respiratory: Lungs clear to auscultation	, good air movement  Psychiatry: A & O x 3, Mood & affect appropriate  Gastrointestinal:  soft nt nd normoactive bs	  Skin: No rashes, no ecchymoses, no cyanosis	  Neurologic: grossly non-focal  Vascular: Peripheral pulses palpable bilaterally    LABS:	 	  CBC Full  -  ( 03 Jul 2021 14:08 )  WBC Count : 7.88 K/uL  Hemoglobin : 14.2 g/dL  Hematocrit : 42.3 %  Platelet Count - Automated : 234 K/uL    07-03  139  |  107  |  14  ----------------------------<  132<H>  4.7   |  28  |  0.72    Ca    8.9      03 Jul 2021 14:08    TPro  7.0  /  Alb  3.4  /  TBili  0.5  /  DBili  x   /  AST  21  /  ALT  21  /  AlkPhos  96  07-03  proBNP: Serum Pro-Brain Natriuretic Peptide: 240 pg/mL (07-03 @ 14:08)    Lipid Profile:   HgA1c:   TSH: Thyroid Stimulating Hormone, Serum: 2.350 uIU/mL (07-03 @ 20:07)    CARDIAC MARKERS:  Troponin I, Serum: <.015 ng/mL (07-03 @ 14:08)    ECG:  	SR, LBBB  RADIOLOGY:  6/30/2020: carotid duplex without hemodynamically significant stenosis  OTHER: 	    PREVIOUS DIAGNOSTIC TESTING:    [x] Echocardiogram: 6/30/2020:  EF 65%, DD1, mildly restrictive MV leaflets  [ ] Catheterization:   [x] Stress Test:  	7/21/2020: normal MPI with vasodilator stress testing

## 2021-07-04 NOTE — PHYSICAL THERAPY INITIAL EVALUATION ADULT - PERTINENT HX OF CURRENT PROBLEM, REHAB EVAL
This is the hx of REMY. a 86 y/o female patient who [place] [facility] [place] complications of Dizziness affecting medical condition and with subsequent affection on functional mobility.

## 2021-07-04 NOTE — PHYSICAL THERAPY INITIAL EVALUATION ADULT - BALANCE TRAINING, PT EVAL
Pt will increase static/dynamic standing balance to WFL to perform all functional mobility without LOB, by 2-3 weeks

## 2021-07-04 NOTE — CONSULT NOTE ADULT - ASSESSMENT
87F HTN, HLD, DM who presented with several weeks of dizziness with acute episode of lightheadedness associated with vomiting and difficulty ambulating. Chronic SOB which has been documented in the past and has not changed in character. 87F HTN, HLD, DM who presented with several weeks of dizziness with acute episode of lightheadedness associated with vomiting and difficulty ambulating, found to have LBBB.    Upon chart review of outpatient records she had a LBBB in 6/2020 for which evaluation of her chronic symptoms revealed nl LVEF, DD1, and no significant carotid disease.    Symptoms appear to be less likely cardiac in etiology; repeat TTE and carotid duplex to rule-out structural heart/atherosclerotic disease.          Sarabjit Fajardo MD, [facility]  , [employer] [facility] [employer] [facility]  [address], [place] [address]  office: [phone]

## 2021-07-04 NOTE — PHYSICAL THERAPY INITIAL EVALUATION ADULT - CRITERIA FOR SKILLED THERAPEUTIC INTERVENTIONS
subacute rehab/impairments found/functional limitations in following categories/risk reduction/prevention/rehab potential/therapy frequency/predicted duration of therapy intervention/anticipated discharge recommendation

## 2021-07-04 NOTE — PHYSICAL THERAPY INITIAL EVALUATION ADULT - BED MOBILITY TRAINING, PT EVAL
Pt will independently perform all aspects of bed mobility to help prevent pressure ulcers, by 2-3 weeks

## 2021-07-04 NOTE — PHYSICAL THERAPY INITIAL EVALUATION ADULT - GAIT TRAINING, PT EVAL
Independent in ambulation with use of no Device device up to 400 feet observing proper gait pattern, posture and use of walking device safely.

## 2021-07-04 NOTE — PHYSICAL THERAPY INITIAL EVALUATION ADULT - GENERAL OBSERVATIONS, REHAB EVAL
Pt encountered sitting on the edge of the bed. AxOx4, [place]ralt[place]/english speaking patient, cooperative.

## 2021-07-04 NOTE — PHYSICAL THERAPY INITIAL EVALUATION ADULT - DID THE PATIENT HAVE SURGERY?
This is the hx of JANNETTE a 88 y/o female patient who was admitted to [facility] due to complications of Dizziness affecting medical condition and with subsequent affection on functional mobility./n/a

## 2021-07-04 NOTE — PHYSICAL THERAPY INITIAL EVALUATION ADULT - STRENGTHENING, PT EVAL
Improve strength in B LE's to 5/5 and be able to perform functional tasks-bed mobility, sitting, standing, transfers and ambulate in a safe manner with or without  assistive device and prevent falls.

## 2021-07-04 NOTE — PHYSICAL THERAPY INITIAL EVALUATION ADULT - ADDITIONAL COMMENTS
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 pt was independent with no use of AD, still drives a car.

## 2021-07-05 LAB
GLUCOSE BLDC GLUCOMTR-MCNC: 189 MG/DL — HIGH (ref 70–99)
GLUCOSE BLDC GLUCOMTR-MCNC: 197 MG/DL — HIGH (ref 70–99)

## 2021-07-05 PROCEDURE — 70551 MRI BRAIN STEM W/O DYE: CPT | Mod: 26

## 2021-07-05 PROCEDURE — 93880 EXTRACRANIAL BILAT STUDY: CPT | Mod: 26

## 2021-07-05 RX ORDER — DEXTROSE 50 % IN WATER 50 %
12.5 SYRINGE (ML) INTRAVENOUS ONCE
Refills: 0 | Status: DISCONTINUED | OUTPATIENT
Start: 2021-07-05 | End: 2021-07-13

## 2021-07-05 RX ORDER — DEXTROSE 50 % IN WATER 50 %
25 SYRINGE (ML) INTRAVENOUS ONCE
Refills: 0 | Status: DISCONTINUED | OUTPATIENT
Start: 2021-07-05 | End: 2021-07-13

## 2021-07-05 RX ORDER — INSULIN GLARGINE 100 [IU]/ML
20 INJECTION, SOLUTION SUBCUTANEOUS
Qty: 0 | Refills: 0 | DISCHARGE

## 2021-07-05 RX ORDER — GLUCAGON INJECTION, SOLUTION 0.5 MG/.1ML
1 INJECTION, SOLUTION SUBCUTANEOUS ONCE
Refills: 0 | Status: DISCONTINUED | OUTPATIENT
Start: 2021-07-05 | End: 2021-07-13

## 2021-07-05 RX ORDER — SODIUM CHLORIDE 9 MG/ML
1000 INJECTION, SOLUTION INTRAVENOUS
Refills: 0 | Status: DISCONTINUED | OUTPATIENT
Start: 2021-07-05 | End: 2021-07-13

## 2021-07-05 RX ORDER — DEXTROSE 50 % IN WATER 50 %
15 SYRINGE (ML) INTRAVENOUS ONCE
Refills: 0 | Status: DISCONTINUED | OUTPATIENT
Start: 2021-07-05 | End: 2021-07-13

## 2021-07-05 RX ORDER — METOPROLOL TARTRATE 50 MG
15 TABLET ORAL
Qty: 0 | Refills: 0 | DISCHARGE

## 2021-07-05 RX ORDER — INSULIN GLARGINE 100 [IU]/ML
10 INJECTION, SOLUTION SUBCUTANEOUS AT BEDTIME
Refills: 0 | Status: DISCONTINUED | OUTPATIENT
Start: 2021-07-05 | End: 2021-07-13

## 2021-07-05 RX ORDER — LOSARTAN POTASSIUM 100 MG/1
1 TABLET, FILM COATED ORAL
Qty: 0 | Refills: 0 | DISCHARGE

## 2021-07-05 RX ORDER — INSULIN LISPRO 100/ML
VIAL (ML) SUBCUTANEOUS
Refills: 0 | Status: DISCONTINUED | OUTPATIENT
Start: 2021-07-05 | End: 2021-07-13

## 2021-07-05 RX ORDER — ATORVASTATIN CALCIUM 80 MG/1
40 TABLET, FILM COATED ORAL AT BEDTIME
Refills: 0 | Status: DISCONTINUED | OUTPATIENT
Start: 2021-07-05 | End: 2021-07-13

## 2021-07-05 RX ORDER — METOPROLOL TARTRATE 50 MG
12.5 TABLET ORAL
Refills: 0 | Status: DISCONTINUED | OUTPATIENT
Start: 2021-07-05 | End: 2021-07-13

## 2021-07-05 RX ORDER — LOSARTAN POTASSIUM 100 MG/1
25 TABLET, FILM COATED ORAL DAILY
Refills: 0 | Status: DISCONTINUED | OUTPATIENT
Start: 2021-07-05 | End: 2021-07-13

## 2021-07-05 RX ADMIN — HEPARIN SODIUM 5000 UNIT(S): 5000 INJECTION INTRAVENOUS; SUBCUTANEOUS at 05:07

## 2021-07-05 RX ADMIN — Medication 25 MILLIGRAM(S): at 14:04

## 2021-07-05 RX ADMIN — Medication 12.5 MILLIGRAM(S): at 18:09

## 2021-07-05 RX ADMIN — Medication 1: at 15:56

## 2021-07-05 RX ADMIN — LOSARTAN POTASSIUM 50 MILLIGRAM(S): 100 TABLET, FILM COATED ORAL at 05:07

## 2021-07-05 RX ADMIN — ATORVASTATIN CALCIUM 40 MILLIGRAM(S): 80 TABLET, FILM COATED ORAL at 21:17

## 2021-07-05 RX ADMIN — Medication 25 MILLIGRAM(S): at 05:08

## 2021-07-05 RX ADMIN — Medication 30 MILLILITER(S): at 20:21

## 2021-07-05 RX ADMIN — Medication 25 MILLIGRAM(S): at 21:17

## 2021-07-05 RX ADMIN — INSULIN GLARGINE 10 UNIT(S): 100 INJECTION, SOLUTION SUBCUTANEOUS at 21:17

## 2021-07-05 RX ADMIN — LOSARTAN POTASSIUM 25 MILLIGRAM(S): 100 TABLET, FILM COATED ORAL at 18:10

## 2021-07-05 RX ADMIN — HEPARIN SODIUM 5000 UNIT(S): 5000 INJECTION INTRAVENOUS; SUBCUTANEOUS at 18:09

## 2021-07-06 ENCOUNTER — TRANSCRIPTION ENCOUNTER (OUTPATIENT)
Age: 86
End: 2021-07-06

## 2021-07-06 LAB
-  AMIKACIN: SIGNIFICANT CHANGE UP
-  AMOXICILLIN/CLAVULANIC ACID: SIGNIFICANT CHANGE UP
-  AMPICILLIN/SULBACTAM: SIGNIFICANT CHANGE UP
-  AMPICILLIN: SIGNIFICANT CHANGE UP
-  AZTREONAM: SIGNIFICANT CHANGE UP
-  CEFAZOLIN: SIGNIFICANT CHANGE UP
-  CEFEPIME: SIGNIFICANT CHANGE UP
-  CEFOXITIN: SIGNIFICANT CHANGE UP
-  CEFTRIAXONE: SIGNIFICANT CHANGE UP
-  CIPROFLOXACIN: SIGNIFICANT CHANGE UP
-  ERTAPENEM: SIGNIFICANT CHANGE UP
-  GENTAMICIN: SIGNIFICANT CHANGE UP
-  IMIPENEM: SIGNIFICANT CHANGE UP
-  LEVOFLOXACIN: SIGNIFICANT CHANGE UP
-  MEROPENEM: SIGNIFICANT CHANGE UP
-  NITROFURANTOIN: SIGNIFICANT CHANGE UP
-  PIPERACILLIN/TAZOBACTAM: SIGNIFICANT CHANGE UP
-  TIGECYCLINE: SIGNIFICANT CHANGE UP
-  TOBRAMYCIN: SIGNIFICANT CHANGE UP
-  TRIMETHOPRIM/SULFAMETHOXAZOLE: SIGNIFICANT CHANGE UP
A1C WITH ESTIMATED AVERAGE GLUCOSE RESULT: 8.1 % — HIGH (ref 4–5.6)
CULTURE RESULTS: SIGNIFICANT CHANGE UP
ESTIMATED AVERAGE GLUCOSE: 186 MG/DL — HIGH (ref 68–114)
GLUCOSE BLDC GLUCOMTR-MCNC: 134 MG/DL — HIGH (ref 70–99)
GLUCOSE BLDC GLUCOMTR-MCNC: 152 MG/DL — HIGH (ref 70–99)
GLUCOSE BLDC GLUCOMTR-MCNC: 190 MG/DL — HIGH (ref 70–99)
GLUCOSE BLDC GLUCOMTR-MCNC: 272 MG/DL — HIGH (ref 70–99)
GLUCOSE BLDC GLUCOMTR-MCNC: 349 MG/DL — HIGH (ref 70–99)
METHOD TYPE: SIGNIFICANT CHANGE UP
ORGANISM # SPEC MICROSCOPIC CNT: SIGNIFICANT CHANGE UP
ORGANISM # SPEC MICROSCOPIC CNT: SIGNIFICANT CHANGE UP
SPECIMEN SOURCE: SIGNIFICANT CHANGE UP

## 2021-07-06 PROCEDURE — 93306 TTE W/DOPPLER COMPLETE: CPT | Mod: 26

## 2021-07-06 RX ORDER — MECLIZINE HCL 12.5 MG
1 TABLET ORAL
Qty: 0 | Refills: 0 | DISCHARGE
Start: 2021-07-06

## 2021-07-06 RX ORDER — ONDANSETRON 8 MG/1
4 TABLET, FILM COATED ORAL DAILY
Refills: 0 | Status: DISCONTINUED | OUTPATIENT
Start: 2021-07-06 | End: 2021-07-13

## 2021-07-06 RX ORDER — ONDANSETRON 8 MG/1
1 TABLET, FILM COATED ORAL
Qty: 21 | Refills: 0
Start: 2021-07-06 | End: 2021-07-12

## 2021-07-06 RX ORDER — INSULIN GLARGINE 100 [IU]/ML
24 INJECTION, SOLUTION SUBCUTANEOUS
Qty: 0 | Refills: 0 | DISCHARGE

## 2021-07-06 RX ADMIN — Medication 12.5 MILLIGRAM(S): at 05:25

## 2021-07-06 RX ADMIN — LOSARTAN POTASSIUM 25 MILLIGRAM(S): 100 TABLET, FILM COATED ORAL at 05:24

## 2021-07-06 RX ADMIN — Medication 25 MILLIGRAM(S): at 05:25

## 2021-07-06 RX ADMIN — HEPARIN SODIUM 5000 UNIT(S): 5000 INJECTION INTRAVENOUS; SUBCUTANEOUS at 18:17

## 2021-07-06 RX ADMIN — HEPARIN SODIUM 5000 UNIT(S): 5000 INJECTION INTRAVENOUS; SUBCUTANEOUS at 05:21

## 2021-07-06 RX ADMIN — ONDANSETRON 4 MILLIGRAM(S): 8 TABLET, FILM COATED ORAL at 07:57

## 2021-07-06 RX ADMIN — Medication 1: at 07:56

## 2021-07-06 RX ADMIN — ATORVASTATIN CALCIUM 40 MILLIGRAM(S): 80 TABLET, FILM COATED ORAL at 21:19

## 2021-07-06 RX ADMIN — Medication 4: at 11:11

## 2021-07-06 RX ADMIN — Medication 25 MILLIGRAM(S): at 13:49

## 2021-07-06 RX ADMIN — Medication 12.5 MILLIGRAM(S): at 18:17

## 2021-07-06 RX ADMIN — Medication 25 MILLIGRAM(S): at 21:19

## 2021-07-06 RX ADMIN — INSULIN GLARGINE 10 UNIT(S): 100 INJECTION, SOLUTION SUBCUTANEOUS at 22:06

## 2021-07-06 NOTE — DISCHARGE NOTE PROVIDER - CARE PROVIDERS DIRECT ADDRESSES
[email or web address],[email or web address],DirectAddress_Unknown

## 2021-07-06 NOTE — DISCHARGE NOTE PROVIDER - NSDCCPCAREPLAN_GEN_ALL_CORE_FT
PRINCIPAL DISCHARGE DIAGNOSIS  Diagnosis: Severe vertigo  Assessment and Plan of Treatment:       SECONDARY DISCHARGE DIAGNOSES  Diagnosis: Dizziness  Assessment and Plan of Treatment: Dizziness

## 2021-07-06 NOTE — DISCHARGE NOTE PROVIDER - CARE PROVIDER_API CALL
Francesco [place] [address]  Phone: [phone]  Fax: [phone]  Follow Up Time:     Amber Lowe  NEUROLOGY  [facility], [place] [id number]  Phone: [phone]  Fax: [phone]  Follow Up Time:     ENT,   Phone: (   )    -  Fax: (   )    -  Follow Up Time:

## 2021-07-06 NOTE — DISCHARGE NOTE PROVIDER - NSDCMRMEDTOKEN_GEN_ALL_CORE_FT
atorvastatin 40 mg oral tablet: 1 tab(s) orally once a day  losartan 25 mg oral tablet: 1 tab(s) orally once a day  meclizine 25 mg oral tablet: 1 tab(s) orally 3 times a day  metoprolol tartrate 50 mg oral tablet: 50 milligram(s) orally once a day  repaglinide 1 mg oral tablet: 1 milligram(s) orally once a day (at bedtime)  Zofran 4 mg oral tablet: 1 tab(s) orally every 8 hours    atorvastatin 40 mg oral tablet: 1 tab(s) orally once a day  losartan 25 mg oral tablet: 1 tab(s) orally once a day  meclizine 25 mg oral tablet: 1 tab(s) orally 3 times a day  metoprolol tartrate 50 mg oral tablet: 50 milligram(s) orally once a day  repaglinide 1 mg oral tablet: 1 milligram(s) orally once a day (at bedtime)  senna oral tablet: 2 tab(s) orally once a day (at bedtime), As needed, Constipation  sulfamethoxazole-trimethoprim 400 mg-80 mg oral tablet: 1 tab(s) orally 2 times a day  Zofran 4 mg oral tablet: 1 tab(s) orally every 8 hours

## 2021-07-06 NOTE — DISCHARGE NOTE PROVIDER - HOSPITAL COURSE
patient  rx  with  meclezine  zofran  ivf   with  moderate  improvement   LBB old  no  cardiac  issues a t this  time  TTE  carotid  doppler  Ct  MRI brain all no  significant  findings  discharge  to  rehab  for  ENT  F/U  as  out  pt  discussed  with  patient  and  pt's daughter patient  rx  with  meclezine  zofran  ivf   with  moderate  improvement   LBB old  no  cardiac  issues a t this  time  TTE  carotid  doppler  Ct  MRI brain all no  significant  findings  discharge  to  home  with  home  PT for  ENT  F/U  as  out  pt  discussed  with  patient  and  pt's daughter

## 2021-07-06 NOTE — DISCHARGE NOTE PROVIDER - PROVIDER TOKENS
PROVIDER:[TOKEN:[[id number]]],PROVIDER:[TOKEN:[[id number]]],FREE:[LAST:[ENT],PHONE:[(   )    -],FAX:[(   )    -]]

## 2021-07-07 LAB
GLUCOSE BLDC GLUCOMTR-MCNC: 177 MG/DL — HIGH (ref 70–99)
GLUCOSE BLDC GLUCOMTR-MCNC: 191 MG/DL — HIGH (ref 70–99)
GLUCOSE BLDC GLUCOMTR-MCNC: 254 MG/DL — HIGH (ref 70–99)
GLUCOSE BLDC GLUCOMTR-MCNC: 364 MG/DL — HIGH (ref 70–99)

## 2021-07-07 RX ORDER — ACETAMINOPHEN 500 MG
650 TABLET ORAL EVERY 6 HOURS
Refills: 0 | Status: DISCONTINUED | OUTPATIENT
Start: 2021-07-07 | End: 2021-07-13

## 2021-07-07 RX ORDER — POLYETHYLENE GLYCOL 3350 17 G/17G
17 POWDER, FOR SOLUTION ORAL ONCE
Refills: 0 | Status: COMPLETED | OUTPATIENT
Start: 2021-07-07 | End: 2021-07-07

## 2021-07-07 RX ORDER — ONDANSETRON 8 MG/1
4 TABLET, FILM COATED ORAL ONCE
Refills: 0 | Status: COMPLETED | OUTPATIENT
Start: 2021-07-07 | End: 2021-07-07

## 2021-07-07 RX ADMIN — Medication 25 MILLIGRAM(S): at 05:22

## 2021-07-07 RX ADMIN — ATORVASTATIN CALCIUM 40 MILLIGRAM(S): 80 TABLET, FILM COATED ORAL at 21:36

## 2021-07-07 RX ADMIN — Medication 1: at 16:24

## 2021-07-07 RX ADMIN — Medication 1: at 08:01

## 2021-07-07 RX ADMIN — Medication 3: at 11:25

## 2021-07-07 RX ADMIN — ONDANSETRON 4 MILLIGRAM(S): 8 TABLET, FILM COATED ORAL at 11:17

## 2021-07-07 RX ADMIN — Medication 25 MILLIGRAM(S): at 16:22

## 2021-07-07 RX ADMIN — HEPARIN SODIUM 5000 UNIT(S): 5000 INJECTION INTRAVENOUS; SUBCUTANEOUS at 05:22

## 2021-07-07 RX ADMIN — Medication 12.5 MILLIGRAM(S): at 17:43

## 2021-07-07 RX ADMIN — HEPARIN SODIUM 5000 UNIT(S): 5000 INJECTION INTRAVENOUS; SUBCUTANEOUS at 17:43

## 2021-07-07 RX ADMIN — POLYETHYLENE GLYCOL 3350 17 GRAM(S): 17 POWDER, FOR SOLUTION ORAL at 11:17

## 2021-07-07 RX ADMIN — INSULIN GLARGINE 10 UNIT(S): 100 INJECTION, SOLUTION SUBCUTANEOUS at 21:37

## 2021-07-07 RX ADMIN — Medication 25 MILLIGRAM(S): at 21:36

## 2021-07-08 LAB
GLUCOSE BLDC GLUCOMTR-MCNC: 160 MG/DL — HIGH (ref 70–99)
GLUCOSE BLDC GLUCOMTR-MCNC: 168 MG/DL — HIGH (ref 70–99)
GLUCOSE BLDC GLUCOMTR-MCNC: 213 MG/DL — HIGH (ref 70–99)
GLUCOSE BLDC GLUCOMTR-MCNC: 308 MG/DL — HIGH (ref 70–99)
GLUCOSE BLDC GLUCOMTR-MCNC: 339 MG/DL — HIGH (ref 70–99)

## 2021-07-08 RX ADMIN — Medication 2: at 16:38

## 2021-07-08 RX ADMIN — HEPARIN SODIUM 5000 UNIT(S): 5000 INJECTION INTRAVENOUS; SUBCUTANEOUS at 05:32

## 2021-07-08 RX ADMIN — Medication 4: at 11:53

## 2021-07-08 RX ADMIN — Medication 25 MILLIGRAM(S): at 05:32

## 2021-07-08 RX ADMIN — ONDANSETRON 4 MILLIGRAM(S): 8 TABLET, FILM COATED ORAL at 11:56

## 2021-07-08 RX ADMIN — INSULIN GLARGINE 10 UNIT(S): 100 INJECTION, SOLUTION SUBCUTANEOUS at 21:31

## 2021-07-08 RX ADMIN — Medication 12.5 MILLIGRAM(S): at 17:48

## 2021-07-08 RX ADMIN — Medication 25 MILLIGRAM(S): at 21:01

## 2021-07-08 RX ADMIN — ATORVASTATIN CALCIUM 40 MILLIGRAM(S): 80 TABLET, FILM COATED ORAL at 21:01

## 2021-07-08 RX ADMIN — Medication 25 MILLIGRAM(S): at 11:55

## 2021-07-08 RX ADMIN — HEPARIN SODIUM 5000 UNIT(S): 5000 INJECTION INTRAVENOUS; SUBCUTANEOUS at 17:51

## 2021-07-08 RX ADMIN — Medication 1: at 08:22

## 2021-07-09 LAB
FLUAV AG NPH QL: SIGNIFICANT CHANGE UP
FLUBV AG NPH QL: SIGNIFICANT CHANGE UP
GLUCOSE BLDC GLUCOMTR-MCNC: 166 MG/DL — HIGH (ref 70–99)
GLUCOSE BLDC GLUCOMTR-MCNC: 170 MG/DL — HIGH (ref 70–99)
GLUCOSE BLDC GLUCOMTR-MCNC: 172 MG/DL — HIGH (ref 70–99)
GLUCOSE BLDC GLUCOMTR-MCNC: 237 MG/DL — HIGH (ref 70–99)
SARS-COV-2 RNA SPEC QL NAA+PROBE: SIGNIFICANT CHANGE UP

## 2021-07-09 RX ADMIN — ONDANSETRON 4 MILLIGRAM(S): 8 TABLET, FILM COATED ORAL at 11:23

## 2021-07-09 RX ADMIN — HEPARIN SODIUM 5000 UNIT(S): 5000 INJECTION INTRAVENOUS; SUBCUTANEOUS at 17:15

## 2021-07-09 RX ADMIN — Medication 1: at 15:52

## 2021-07-09 RX ADMIN — Medication 12.5 MILLIGRAM(S): at 05:30

## 2021-07-09 RX ADMIN — LOSARTAN POTASSIUM 25 MILLIGRAM(S): 100 TABLET, FILM COATED ORAL at 05:19

## 2021-07-09 RX ADMIN — Medication 25 MILLIGRAM(S): at 13:33

## 2021-07-09 RX ADMIN — ATORVASTATIN CALCIUM 40 MILLIGRAM(S): 80 TABLET, FILM COATED ORAL at 21:07

## 2021-07-09 RX ADMIN — Medication 2: at 11:23

## 2021-07-09 RX ADMIN — INSULIN GLARGINE 10 UNIT(S): 100 INJECTION, SOLUTION SUBCUTANEOUS at 21:08

## 2021-07-09 RX ADMIN — Medication 12.5 MILLIGRAM(S): at 17:15

## 2021-07-09 RX ADMIN — Medication 1: at 08:11

## 2021-07-09 RX ADMIN — Medication 25 MILLIGRAM(S): at 21:08

## 2021-07-09 RX ADMIN — HEPARIN SODIUM 5000 UNIT(S): 5000 INJECTION INTRAVENOUS; SUBCUTANEOUS at 05:19

## 2021-07-09 RX ADMIN — Medication 25 MILLIGRAM(S): at 05:19

## 2021-07-09 NOTE — DIETITIAN INITIAL EVALUATION ADULT. - PERTINENT MEDS FT
MEDICATIONS  (STANDING):  atorvastatin 40 milliGRAM(s) Oral at bedtime  dextrose 40% Gel 15 Gram(s) Oral once  dextrose 5%. 1000 milliLiter(s) (50 mL/Hr) IV Continuous <Continuous>  dextrose 5%. 1000 milliLiter(s) (100 mL/Hr) IV Continuous <Continuous>  dextrose 50% Injectable 25 Gram(s) IV Push once  dextrose 50% Injectable 12.5 Gram(s) IV Push once  dextrose 50% Injectable 25 Gram(s) IV Push once  glucagon  Injectable 1 milliGRAM(s) IntraMuscular once  heparin SubCutaneous Injection - Peds 5000 Unit(s) SubCutaneous every 12 hours  insulin glargine Injectable (LANTUS) 10 Unit(s) SubCutaneous at bedtime  insulin lispro (ADMELOG) corrective regimen sliding scale   SubCutaneous three times a day before meals  losartan 25 milliGRAM(s) Oral daily  meclizine 25 milliGRAM(s) Oral three times a day  metoprolol tartrate 12.5 milliGRAM(s) Oral two times a day  ondansetron Injectable 4 milliGRAM(s) IV Push daily    MEDICATIONS  (PRN):  acetaminophen   Tablet .. 650 milliGRAM(s) Oral every 6 hours PRN Temp greater or equal to 38C (100.4F), Mild Pain (1 - 3)  aluminum hydroxide/magnesium hydroxide/simethicone Suspension 30 milliLiter(s) Oral every 4 hours PRN Dyspepsia

## 2021-07-09 NOTE — DIETITIAN INITIAL EVALUATION ADULT. - OTHER INFO
Pt w/ dizziness ; HTN ; unsteady gait ; left bundle branch block. PTA lived alone w/ her son ,she was doing the food shopping / cooking. Denies N/V/D/C/Chewing/Swallowing issues, No food allergies. PTA was vomiting w/ nausea which has stopped. Consuming 50 % of meals. Checks FS 1-2x/d. Takes insulin for her DM at home PTA. Pt without c/o at this time. Receptive to Glucerna Shake 8 oz daily (220 blanche, 10 gm pro). Provided w/ diet education and literature on low sodium diet and Diabetes plate method for meal planning.

## 2021-07-09 NOTE — DIETITIAN INITIAL EVALUATION ADULT. - DIET TYPE
Glucpiedad Shake 8 twice per day (440 blanche, 20 gm pro)/low sodium/consistent carbohydrate (evening snack)/supplement (specify)

## 2021-07-10 LAB
GLUCOSE BLDC GLUCOMTR-MCNC: 176 MG/DL — HIGH (ref 70–99)
GLUCOSE BLDC GLUCOMTR-MCNC: 191 MG/DL — HIGH (ref 70–99)
GLUCOSE BLDC GLUCOMTR-MCNC: 235 MG/DL — HIGH (ref 70–99)
GLUCOSE BLDC GLUCOMTR-MCNC: 254 MG/DL — HIGH (ref 70–99)

## 2021-07-10 RX ADMIN — Medication 12.5 MILLIGRAM(S): at 05:13

## 2021-07-10 RX ADMIN — Medication 25 MILLIGRAM(S): at 05:13

## 2021-07-10 RX ADMIN — Medication 25 MILLIGRAM(S): at 21:17

## 2021-07-10 RX ADMIN — HEPARIN SODIUM 5000 UNIT(S): 5000 INJECTION INTRAVENOUS; SUBCUTANEOUS at 18:30

## 2021-07-10 RX ADMIN — ATORVASTATIN CALCIUM 40 MILLIGRAM(S): 80 TABLET, FILM COATED ORAL at 21:17

## 2021-07-10 RX ADMIN — Medication 3: at 11:37

## 2021-07-10 RX ADMIN — Medication 12.5 MILLIGRAM(S): at 18:30

## 2021-07-10 RX ADMIN — Medication 25 MILLIGRAM(S): at 16:40

## 2021-07-10 RX ADMIN — INSULIN GLARGINE 10 UNIT(S): 100 INJECTION, SOLUTION SUBCUTANEOUS at 21:17

## 2021-07-10 RX ADMIN — Medication 1: at 08:35

## 2021-07-10 RX ADMIN — LOSARTAN POTASSIUM 25 MILLIGRAM(S): 100 TABLET, FILM COATED ORAL at 05:13

## 2021-07-10 RX ADMIN — Medication 1: at 16:26

## 2021-07-10 RX ADMIN — ONDANSETRON 4 MILLIGRAM(S): 8 TABLET, FILM COATED ORAL at 16:25

## 2021-07-10 RX ADMIN — HEPARIN SODIUM 5000 UNIT(S): 5000 INJECTION INTRAVENOUS; SUBCUTANEOUS at 05:13

## 2021-07-11 LAB
GLUCOSE BLDC GLUCOMTR-MCNC: 184 MG/DL — HIGH (ref 70–99)
GLUCOSE BLDC GLUCOMTR-MCNC: 189 MG/DL — HIGH (ref 70–99)
GLUCOSE BLDC GLUCOMTR-MCNC: 212 MG/DL — HIGH (ref 70–99)
GLUCOSE BLDC GLUCOMTR-MCNC: 277 MG/DL — HIGH (ref 70–99)

## 2021-07-11 RX ORDER — SENNA PLUS 8.6 MG/1
2 TABLET ORAL AT BEDTIME
Refills: 0 | Status: DISCONTINUED | OUTPATIENT
Start: 2021-07-12 | End: 2021-07-13

## 2021-07-11 RX ORDER — SENNA PLUS 8.6 MG/1
1 TABLET ORAL ONCE
Refills: 0 | Status: COMPLETED | OUTPATIENT
Start: 2021-07-11 | End: 2021-07-11

## 2021-07-11 RX ADMIN — Medication 25 MILLIGRAM(S): at 05:30

## 2021-07-11 RX ADMIN — HEPARIN SODIUM 5000 UNIT(S): 5000 INJECTION INTRAVENOUS; SUBCUTANEOUS at 16:57

## 2021-07-11 RX ADMIN — HEPARIN SODIUM 5000 UNIT(S): 5000 INJECTION INTRAVENOUS; SUBCUTANEOUS at 05:30

## 2021-07-11 RX ADMIN — Medication 2: at 16:20

## 2021-07-11 RX ADMIN — Medication 25 MILLIGRAM(S): at 13:12

## 2021-07-11 RX ADMIN — Medication 5 MILLIGRAM(S): at 16:57

## 2021-07-11 RX ADMIN — SENNA PLUS 1 TABLET(S): 8.6 TABLET ORAL at 16:57

## 2021-07-11 RX ADMIN — Medication 3: at 11:27

## 2021-07-11 RX ADMIN — ATORVASTATIN CALCIUM 40 MILLIGRAM(S): 80 TABLET, FILM COATED ORAL at 21:34

## 2021-07-11 RX ADMIN — Medication 25 MILLIGRAM(S): at 21:34

## 2021-07-11 RX ADMIN — Medication 12.5 MILLIGRAM(S): at 16:57

## 2021-07-11 RX ADMIN — ONDANSETRON 4 MILLIGRAM(S): 8 TABLET, FILM COATED ORAL at 11:26

## 2021-07-11 RX ADMIN — INSULIN GLARGINE 10 UNIT(S): 100 INJECTION, SOLUTION SUBCUTANEOUS at 21:34

## 2021-07-11 RX ADMIN — Medication 1: at 08:04

## 2021-07-12 LAB
GLUCOSE BLDC GLUCOMTR-MCNC: 179 MG/DL — HIGH (ref 70–99)
GLUCOSE BLDC GLUCOMTR-MCNC: 182 MG/DL — HIGH (ref 70–99)
GLUCOSE BLDC GLUCOMTR-MCNC: 198 MG/DL — HIGH (ref 70–99)
GLUCOSE BLDC GLUCOMTR-MCNC: 296 MG/DL — HIGH (ref 70–99)

## 2021-07-12 RX ADMIN — Medication 1: at 07:41

## 2021-07-12 RX ADMIN — Medication 12.5 MILLIGRAM(S): at 17:32

## 2021-07-12 RX ADMIN — ONDANSETRON 4 MILLIGRAM(S): 8 TABLET, FILM COATED ORAL at 11:10

## 2021-07-12 RX ADMIN — Medication 25 MILLIGRAM(S): at 14:17

## 2021-07-12 RX ADMIN — Medication 1 ENEMA: at 16:36

## 2021-07-12 RX ADMIN — Medication 1: at 15:55

## 2021-07-12 RX ADMIN — LOSARTAN POTASSIUM 25 MILLIGRAM(S): 100 TABLET, FILM COATED ORAL at 05:29

## 2021-07-12 RX ADMIN — HEPARIN SODIUM 5000 UNIT(S): 5000 INJECTION INTRAVENOUS; SUBCUTANEOUS at 05:29

## 2021-07-12 RX ADMIN — Medication 3: at 11:12

## 2021-07-12 RX ADMIN — INSULIN GLARGINE 10 UNIT(S): 100 INJECTION, SOLUTION SUBCUTANEOUS at 21:17

## 2021-07-12 RX ADMIN — Medication 25 MILLIGRAM(S): at 05:29

## 2021-07-12 RX ADMIN — Medication 5 MILLIGRAM(S): at 11:12

## 2021-07-12 RX ADMIN — Medication 12.5 MILLIGRAM(S): at 05:29

## 2021-07-12 NOTE — PROGRESS NOTE ADULT - ASSESSMENT
Subjective Complaints:  Historian:             Vital Signs Last 24 Hrs  T(C): 36.5 (04 Jul 2021 11:38), Max: 37.2 (03 Jul 2021 19:55)  T(F): 97.7 (04 Jul 2021 11:38), Max: 98.9 (03 Jul 2021 19:55)  HR: 75 (04 Jul 2021 11:38) (62 - 76)  BP: 168/84 (04 Jul 2021 11:38) (113/68 - 174/79)  BP(mean): --  RR: 17 (04 Jul 2021 11:38) (15 - 18)  SpO2: 95% (04 Jul 2021 11:38) (92% - 97%)    GENERAL PHYSICAL EXAM:  General:  Appears stated age, well-groomed, well-nourished, no distress  HEENT:  NC/AT, patent nares w/ pink mucosa, OP clear w/o lesions, PERRL, EOMI, conjunctivae clear, no thyromegaly, nodules, adenopathy, no JVD  Chest:  Full & symmetric excursion, no increased effort, breath sounds clear  Cardiovascular:  Regular rhythm, S1, S2, no murmur/rub/S3/S4, no carotid/femoral/abdominal bruit, radial/pedal pulses 2+, no edema  Abdomen:  Soft, non-tender, non-distended, normoactive bowel sounds, no HSM  Extremities:  Gait & station:   Digits:   Nails:   Joints, Bones, Muscles:   ROM:   Stability:  Skin:  No rash/erythema/ecchymoses/petechiae/wounds/abscess/warm/dry  Musculoskeletal:  Full ROM in all joints w/o swelling/tenderness/effusion        LABS:                        14.2   7.88  )-----------( 234      ( 03 Jul 2021 14:08 )             42.3     07-03    139  |  107  |  14  ----------------------------<  132<H>  4.7   |  28  |  0.72    Ca    8.9      03 Jul 2021 14:08    TPro  7.0  /  Alb  3.4  /  TBili  0.5  /  DBili  x   /  AST  21  /  ALT  21  /  AlkPhos  96  07-03          RADIOLOGY & ADDITIONAL STUDIES:        Neurology Progress Note:      Mental Status: awaek alert speech fluent folows commands  less dizziness       Cranial Nerves: 2 12intact      Motor:   arm leg 4/5         Sensory: intact      Cerebellar: defrd      Gait: unsteady  due to dizziness       Assesment/Plan: htn dizziness unsteady gait for mri michelle no seizure ct heaD NO ACUTE PATH         
Subjective Complaints:  Historian:             Vital Signs Last 24 Hrs  T(C): 36.7 (12 Jul 2021 17:02), Max: 36.7 (12 Jul 2021 17:02)  T(F): 98.1 (12 Jul 2021 17:02), Max: 98.1 (12 Jul 2021 17:02)  HR: 78 (12 Jul 2021 17:02) (66 - 78)  BP: 168/71 (12 Jul 2021 17:02) (129/58 - 168/71)  BP(mean): --  RR: 18 (12 Jul 2021 17:02) (18 - 18)  SpO2: 100% (12 Jul 2021 17:02) (94% - 100%)    GENERAL PHYSICAL EXAM:  General:  Appears stated age, well-groomed, well-nourished, no distress  HEENT:  NC/AT, patent nares w/ pink mucosa, OP clear w/o lesions, PERRL, EOMI, conjunctivae clear, no thyromegaly, nodules, adenopathy, no JVD  Chest:  Full & symmetric excursion, no increased effort, breath sounds clear  Cardiovascular:  Regular rhythm, S1, S2, no murmur/rub/S3/S4, no carotid/femoral/abdominal bruit, radial/pedal pulses 2+, no edema  Abdomen:  Soft, non-tender, non-distended, normoactive bowel sounds, no HSM  Extremities:  Gait & station:   Digits:   Nails:   Joints, Bones, Muscles:   ROM:   Stability:  Skin:  No rash/erythema/ecchymoses/petechiae/wounds/abscess/warm/dry  Musculoskeletal:  Full ROM in all joints w/o swelling/tenderness/effusion        LABS:                RADIOLOGY & ADDITIONAL STUDIES:        Neurology Progress Note:      Mental Status: awaek alert speech fluent follows commands       Cranial Nerves: 2 12intact      Motor:   arm leg 4/5         Sensory: intact      Cerebellar: derd      Gait: unsteady       Assesment/Plan: ch dizziness arm leg 4/5 no headache no seizure for pt rehab htn        
Subjective Complaints:  Historian:             Vital Signs Last 24 Hrs  T(C): 36.9 (08 Jul 2021 17:00), Max: 36.9 (08 Jul 2021 17:00)  T(F): 98.5 (08 Jul 2021 17:00), Max: 98.5 (08 Jul 2021 17:00)  HR: 77 (08 Jul 2021 17:00) (67 - 80)  BP: 126/77 (08 Jul 2021 17:00) (106/55 - 152/85)  BP(mean): --  RR: 18 (08 Jul 2021 17:00) (18 - 18)  SpO2: 95% (08 Jul 2021 17:00) (95% - 97%)    GENERAL PHYSICAL EXAM:  General:  Appears stated age, well-groomed, well-nourished, no distress  HEENT:  NC/AT, patent nares w/ pink mucosa, OP clear w/o lesions, PERRL, EOMI, conjunctivae clear, no thyromegaly, nodules, adenopathy, no JVD  Chest:  Full & symmetric excursion, no increased effort, breath sounds clear  Cardiovascular:  Regular rhythm, S1, S2, no murmur/rub/S3/S4, no carotid/femoral/abdominal bruit, radial/pedal pulses 2+, no edema  Abdomen:  Soft, non-tender, non-distended, normoactive bowel sounds, no HSM  Extremities:  Gait & station:   Digits:   Nails:   Joints, Bones, Muscles:   ROM:   Stability:  Skin:  No rash/erythema/ecchymoses/petechiae/wounds/abscess/warm/dry  Musculoskeletal:  Full ROM in all joints w/o swelling/tenderness/effusion        LABS:                RADIOLOGY & ADDITIONAL STUDIES:        Neurology Progress Note:      Mental Status: awaek alert spech fluent follows commands       Cranial Nerves: 2 12intact      Motor:   arm leg 4/5         Sensory: intact      Cerebellar: defrd      Gait: unsteady       Assesment/Plan: less dizziness  arm leg 4/5 mri michelle noted  no acute  path, for pt         
Subjective Complaints:  Historian:             Vital Signs Last 24 Hrs  T(C): 36.7 (05 Jul 2021 17:05), Max: 36.7 (05 Jul 2021 11:42)  T(F): 98 (05 Jul 2021 17:05), Max: 98 (05 Jul 2021 11:42)  HR: 80 (05 Jul 2021 17:05) (70 - 80)  BP: 123/66 (05 Jul 2021 17:05) (123/66 - 140/82)  BP(mean): --  RR: 18 (05 Jul 2021 17:05) (18 - 18)  SpO2: 93% (05 Jul 2021 17:05) (93% - 98%)    GENERAL PHYSICAL EXAM:  General:  Appears stated age, well-groomed, well-nourished, no distress  HEENT:  NC/AT, patent nares w/ pink mucosa, OP clear w/o lesions, PERRL, EOMI, conjunctivae clear, no thyromegaly, nodules, adenopathy, no JVD  Chest:  Full & symmetric excursion, no increased effort, breath sounds clear  Cardiovascular:  Regular rhythm, S1, S2, no murmur/rub/S3/S4, no carotid/femoral/abdominal bruit, radial/pedal pulses 2+, no edema  Abdomen:  Soft, non-tender, non-distended, normoactive bowel sounds, no HSM  Extremities:  Gait & station:   Digits:   Nails:   Joints, Bones, Muscles:   ROM:   Stability:  Skin:  No rash/erythema/ecchymoses/petechiae/wounds/abscess/warm/dry  Musculoskeletal:  Full ROM in all joints w/o swelling/tenderness/effusion        LABS:                RADIOLOGY & ADDITIONAL STUDIES:        Neurology Progress Note:      Mental Status: awake alert  speech fluent  follows commands       Cranial Nerves: eom intact 2 12intact      Motor:   arm leg 4/5         Sensory: intact      Cerebellar: defrd      Gait:unsteady       Assesment/Plan: s/p dizziness unsyeady gait mr i michelle no acute path  carotid doppler no stenosis

## 2021-07-13 ENCOUNTER — TRANSCRIPTION ENCOUNTER (OUTPATIENT)
Age: 86
End: 2021-07-13

## 2021-07-13 VITALS
DIASTOLIC BLOOD PRESSURE: 64 MMHG | OXYGEN SATURATION: 92 % | SYSTOLIC BLOOD PRESSURE: 108 MMHG | HEART RATE: 68 BPM | RESPIRATION RATE: 16 BRPM | TEMPERATURE: 98 F

## 2021-07-13 LAB
GLUCOSE BLDC GLUCOMTR-MCNC: 143 MG/DL — HIGH (ref 70–99)
GLUCOSE BLDC GLUCOMTR-MCNC: 224 MG/DL — HIGH (ref 70–99)
GLUCOSE BLDC GLUCOMTR-MCNC: 268 MG/DL — HIGH (ref 70–99)

## 2021-07-13 RX ORDER — SENNA PLUS 8.6 MG/1
2 TABLET ORAL
Qty: 0 | Refills: 0 | DISCHARGE
Start: 2021-07-13

## 2021-07-13 RX ADMIN — Medication 2: at 16:26

## 2021-07-13 RX ADMIN — Medication 3: at 11:08

## 2021-07-13 RX ADMIN — Medication 25 MILLIGRAM(S): at 14:47

## 2021-07-13 RX ADMIN — ONDANSETRON 4 MILLIGRAM(S): 8 TABLET, FILM COATED ORAL at 11:13

## 2021-07-13 NOTE — PROGRESS NOTE ADULT - NUTRITIONAL ASSESSMENT
This patient has been assessed with a concern for Malnutrition and has been determined to have a diagnosis/diagnoses of Moderate protein-calorie malnutrition.    This patient is being managed with:   Diet Consistent Carbohydrate w/Evening Snack-  Low Sodium  Supplement Feeding Modality:  Oral  Glucerna Shake Cans or Servings Per Day:  1       Frequency:  Two Times a day  Entered: Jul 9 2021 12:50PM    

## 2021-07-13 NOTE — DISCHARGE NOTE NURSING/CASE MANAGEMENT/SOCIAL WORK - NSSCCARECORD_GEN_ALL_CORE
Home Care Agency/Durable Medical Equipment Agency Durable Medical Equipment Agency/Community Resource

## 2021-07-13 NOTE — PROGRESS NOTE ADULT - NSICDXPILOT_GEN_ALL_CORE
[place]

## 2021-07-13 NOTE — DISCHARGE NOTE NURSING/CASE MANAGEMENT/SOCIAL WORK - PATIENT PORTAL LINK FT
You can access the FollowMyHealth Patient Portal offered by [facility] by registering at the following website: [email or web address]. By joining [facility]’s FollowMyHealth portal, you will also be able to view your health information using other applications (apps) compatible with our system.

## 2021-07-13 NOTE — DISCHARGE NOTE NURSING/CASE MANAGEMENT/SOCIAL WORK - NSDCCRNAME_GEN_ALL_CORE_FT
Recommend Vestibular Rehab as outpatient at [facility] in [place] (twice per week)

## 2021-07-13 NOTE — PROGRESS NOTE ADULT - SUBJECTIVE AND OBJECTIVE BOX
INTERVAL HPI/OVERNIGHT EVENTS:    awaiting  discharge  to  rehab    REVIEW OF SYSTEMS:  CONSTITUTIONAL:  no  complaints    NECK: No pain or stiffnes  RESPIRATORY: No SOB   CARDIOVASCULAR: No chest pain, palpitations, dizziness,   GASTROINTESTINAL: No abdominal pain. No nausea, vomiting,   NEUROLOGICAL: No headaches, no  blurry  vision   mild   dizziness  SKIN: No itching,   MUSCULOSKELETAL: No pain    MEDICATION:  acetaminophen   Tablet .. 650 milliGRAM(s) Oral every 6 hours PRN  aluminum hydroxide/magnesium hydroxide/simethicone Suspension 30 milliLiter(s) Oral every 4 hours PRN  atorvastatin 40 milliGRAM(s) Oral at bedtime  dextrose 40% Gel 15 Gram(s) Oral once  dextrose 5%. 1000 milliLiter(s) IV Continuous <Continuous>  dextrose 5%. 1000 milliLiter(s) IV Continuous <Continuous>  dextrose 50% Injectable 25 Gram(s) IV Push once  dextrose 50% Injectable 12.5 Gram(s) IV Push once  dextrose 50% Injectable 25 Gram(s) IV Push once  glucagon  Injectable 1 milliGRAM(s) IntraMuscular once  heparin SubCutaneous Injection - Peds 5000 Unit(s) SubCutaneous every 12 hours  insulin glargine Injectable (LANTUS) 10 Unit(s) SubCutaneous at bedtime  insulin lispro (ADMELOG) corrective regimen sliding scale   SubCutaneous three times a day before meals  losartan 25 milliGRAM(s) Oral daily  meclizine 25 milliGRAM(s) Oral three times a day  metoprolol tartrate 12.5 milliGRAM(s) Oral two times a day  ondansetron Injectable 4 milliGRAM(s) IV Push daily    Vital Signs Last 24 Hrs  T(C): 36.3 (08 Jul 2021 05:12), Max: 36.6 (07 Jul 2021 23:00)  T(F): 97.4 (08 Jul 2021 05:12), Max: 97.9 (07 Jul 2021 23:00)  HR: 67 (08 Jul 2021 05:12) (67 - 79)  BP: 106/55 (08 Jul 2021 05:12) (106/55 - 152/78)  BP(mean): --  RR: 18 (08 Jul 2021 05:12) (17 - 18)  SpO2: 96% (08 Jul 2021 05:12) (93% - 96%)    PHYSICAL EXAM:  GENERAL: NAD, well-groomed, well-developed  HEENT : Conjuntivae  clear sclerae anicteric  NECK: Supple, No JVD, Normal thyroid  NERVOUS SYSTEM:  Alert oriented   no  focal  deficits;   CHEST/LUNG: Clear    HEART: Regular rate and rhythm; No murmurs, rubs, or gallops  ABDOMEN: Soft, Nontender, Nondistended; Bowel sounds present  EXTREMITIES:  no  edema no  tenderness  SKIN: No rashes   LABS:              CAPILLARY BLOOD GLUCOSE      POCT Blood Glucose.: 168 mg/dL (08 Jul 2021 07:42)  POCT Blood Glucose.: 364 mg/dL (07 Jul 2021 21:01)  POCT Blood Glucose.: 191 mg/dL (07 Jul 2021 15:47)  POCT Blood Glucose.: 254 mg/dL (07 Jul 2021 11:18)      RADIOLOGY & ADDITIONAL TESTS:    Imaging reports  Personally Reviewed:  [ ] YES  [ ] NO    Consultant(s) Notes Reviewed:  [ x] YES  [ ] NO    Care Discussed with Consultants/Other Providers [ x] YES  [ ] NO  Problem/Plan - 1:  ·  Problem: Dizziness.  Plan: IVF MECLEZINE.      Problem/Plan - 2:  ·  Problem: HTN (hypertension).  Plan: LOSARTAN.     Problem/Plan - 3:  ·  Problem: Unsteady gait.  Plan: MECLEZINE  PT  EVAL.     Problem/Plan - 4:  ·  Problem: LBBB (left bundle branch block).  Plan:  old  cardiology  eval  appreciated for  TTE     discharge to str
    INTERVAL HPI/OVERNIGHT EVENTS:    awaiting  expediated    appeal to  rehab    REVIEW OF SYSTEMS:  CONSTITUTIONAL:  improved      NECK: No pain or stiffnes  RESPIRATORY: No SOB   CARDIOVASCULAR: No chest pain, palpitations, dizziness,   GASTROINTESTINAL: No abdominal pain. No nausea, vomiting,   NEUROLOGICAL: No headaches, no  blurry  vision no  dizziness  SKIN: No itching,   MUSCULOSKELETAL: No pain    MEDICATION:  acetaminophen   Tablet .. 650 milliGRAM(s) Oral every 6 hours PRN  aluminum hydroxide/magnesium hydroxide/simethicone Suspension 30 milliLiter(s) Oral every 4 hours PRN  atorvastatin 40 milliGRAM(s) Oral at bedtime  dextrose 40% Gel 15 Gram(s) Oral once  dextrose 5%. 1000 milliLiter(s) IV Continuous <Continuous>  dextrose 5%. 1000 milliLiter(s) IV Continuous <Continuous>  dextrose 50% Injectable 25 Gram(s) IV Push once  dextrose 50% Injectable 12.5 Gram(s) IV Push once  dextrose 50% Injectable 25 Gram(s) IV Push once  glucagon  Injectable 1 milliGRAM(s) IntraMuscular once  heparin SubCutaneous Injection - Peds 5000 Unit(s) SubCutaneous every 12 hours  insulin glargine Injectable (LANTUS) 10 Unit(s) SubCutaneous at bedtime  insulin lispro (ADMELOG) corrective regimen sliding scale   SubCutaneous three times a day before meals  losartan 25 milliGRAM(s) Oral daily  meclizine 25 milliGRAM(s) Oral three times a day  metoprolol tartrate 12.5 milliGRAM(s) Oral two times a day  ondansetron Injectable 4 milliGRAM(s) IV Push daily    Vital Signs Last 24 Hrs  T(C): 36.4 (10 Jul 2021 04:20), Max: 37.1 (09 Jul 2021 13:18)  T(F): 97.6 (10 Jul 2021 04:20), Max: 98.7 (09 Jul 2021 13:18)  HR: 68 (10 Jul 2021 04:20) (67 - 78)  BP: 120/75 (10 Jul 2021 04:20) (120/75 - 134/70)  BP(mean): --  RR: 18 (10 Jul 2021 04:20) (17 - 18)  SpO2: 94% (10 Jul 2021 04:20) (93% - 97%)    PHYSICAL EXAM:  GENERAL: NAD, well-groomed, well-developed  HEENT : Conjuntivae  clear sclerae anicteric  NECK: Supple, No JVD, Normal thyroid  NERVOUS SYSTEM:  Alert oriented   no  focal  deficits;   CHEST/LUNG: Clear    HEART: Regular rate and rhythm; No murmurs, rubs, or gallops  ABDOMEN: Soft, Nontender, Nondistended; Bowel sounds present  EXTREMITIES:  no  edema no  tenderness  SKIN: No rashes   LABS:              CAPILLARY BLOOD GLUCOSE      POCT Blood Glucose.: 191 mg/dL (10 Jul 2021 07:38)  POCT Blood Glucose.: 166 mg/dL (09 Jul 2021 21:02)  POCT Blood Glucose.: 170 mg/dL (09 Jul 2021 15:26)  POCT Blood Glucose.: 237 mg/dL (09 Jul 2021 11:08)      RADIOLOGY & ADDITIONAL TESTS:    Imaging reports  Personally Reviewed:  [ ] YES  [ ] NO    Consultant(s) Notes Reviewed:  [x ] YES  [ ] NO    Care Discussed with Consultants/Other Providers [x ] YES  [ ] NO  Problem/Plan - 1:  ·  Problem: Dizziness.  Plan: IVF MECLEZINE.      Problem/Plan - 2:  ·  Problem: HTN (hypertension).  Plan: LOSARTAN.     Problem/Plan - 3:  ·  Problem: Unsteady gait.  Plan: MECLEZINE  PT  EVAL.     Problem/Plan - 4:  ·  Problem: LBBB (left bundle branch block).  OLD observation     
    INTERVAL HPI/OVERNIGHT EVENTS:    awaiting  insurance  decision on appeal     REVIEW OF SYSTEMS:  CONSTITUTIONAL:  no  complaints ambualting  with  assistnace    NECK: No pain or stiffnes  RESPIRATORY: No SOB   CARDIOVASCULAR: No chest pain, palpitations, dizziness,   GASTROINTESTINAL: No abdominal pain. No nausea, vomiting,   NEUROLOGICAL: No headaches, no  blurry  vision no  dizziness  SKIN: No itching,   MUSCULOSKELETAL: No pain    MEDICATION:  acetaminophen   Tablet .. 650 milliGRAM(s) Oral every 6 hours PRN  aluminum hydroxide/magnesium hydroxide/simethicone Suspension 30 milliLiter(s) Oral every 4 hours PRN  atorvastatin 40 milliGRAM(s) Oral at bedtime  dextrose 40% Gel 15 Gram(s) Oral once  dextrose 5%. 1000 milliLiter(s) IV Continuous <Continuous>  dextrose 5%. 1000 milliLiter(s) IV Continuous <Continuous>  dextrose 50% Injectable 25 Gram(s) IV Push once  dextrose 50% Injectable 12.5 Gram(s) IV Push once  dextrose 50% Injectable 25 Gram(s) IV Push once  glucagon  Injectable 1 milliGRAM(s) IntraMuscular once  heparin SubCutaneous Injection - Peds 5000 Unit(s) SubCutaneous every 12 hours  insulin glargine Injectable (LANTUS) 10 Unit(s) SubCutaneous at bedtime  insulin lispro (ADMELOG) corrective regimen sliding scale   SubCutaneous three times a day before meals  losartan 25 milliGRAM(s) Oral daily  meclizine 25 milliGRAM(s) Oral three times a day  metoprolol tartrate 12.5 milliGRAM(s) Oral two times a day  ondansetron Injectable 4 milliGRAM(s) IV Push daily    Vital Signs Last 24 Hrs  T(C): 36.6 (11 Jul 2021 11:08), Max: 36.7 (11 Jul 2021 04:53)  T(F): 97.8 (11 Jul 2021 11:08), Max: 98 (11 Jul 2021 04:53)  HR: 74 (11 Jul 2021 11:08) (62 - 74)  BP: 113/79 (11 Jul 2021 11:08) (113/71 - 118/72)  BP(mean): --  RR: 18 (11 Jul 2021 11:08) (18 - 19)  SpO2: 97% (11 Jul 2021 11:08) (93% - 97%)    PHYSICAL EXAM:  GENERAL: NAD, well-groomed, well-developed  HEENT : Conjuntivae  clear sclerae anicteric  NECK: Supple, No JVD, Normal thyroid  NERVOUS SYSTEM:  Alert oriented   no  focal  deficits;   CHEST/LUNG: Clear    HEART: Regular rate and rhythm; No murmurs, rubs, or gallops  ABDOMEN: Soft, Nontender, Nondistended; Bowel sounds present  EXTREMITIES:  no  edema no  tenderness  SKIN: No rashes   LABS:              CAPILLARY BLOOD GLUCOSE      POCT Blood Glucose.: 277 mg/dL (11 Jul 2021 11:27)  POCT Blood Glucose.: 184 mg/dL (11 Jul 2021 07:25)  POCT Blood Glucose.: 235 mg/dL (10 Jul 2021 21:08)  POCT Blood Glucose.: 176 mg/dL (10 Jul 2021 15:39)      RADIOLOGY & ADDITIONAL TESTS:    Imaging reports  Personally Reviewed:  [ ] YES  [ ] NO    Consultant(s) Notes Reviewed:  [ x] YES  [ ] NO    Care Discussed with Consultants/Other Providers [x ] YES  [ ] NO  Problem/Plan - 1:  ·  Problem: Dizziness.  Plan: IVF MECLEZINE.      Problem/Plan - 2:  ·  Problem: HTN (hypertension).  Plan: LOSARTAN.     Problem/Plan - 3:  ·  Problem: Unsteady gait.  Plan: MECLEZINE  PT  EVAL.     Problem/Plan - 4:  ·  Problem: LBBB (left bundle branch block).  OLD observation     
    INTERVAL HPI/OVERNIGHT EVENTS:        REVIEW OF SYSTEMS:  CONSTITUTIONAL:    continues  with  dizziness when  movig  head    NECK: No pain or stiffnes  RESPIRATORY: No SOB   CARDIOVASCULAR: No chest pain, palpitations, dizziness,   GASTROINTESTINAL: No abdominal pain. No nausea, vomiting,   NEUROLOGICAL: No headaches, no  blurry  vision   SKIN: No itching,   MUSCULOSKELETAL: No pain    MEDICATION:  heparin SubCutaneous Injection - Peds 5000 Unit(s) SubCutaneous every 12 hours  losartan 25 milliGRAM(s) Oral daily  meclizine 25 milliGRAM(s) Oral three times a day    Vital Signs Last 24 Hrs  T(C): 36.5 (04 Jul 2021 11:38), Max: 37.2 (03 Jul 2021 19:55)  T(F): 97.7 (04 Jul 2021 11:38), Max: 98.9 (03 Jul 2021 19:55)  HR: 75 (04 Jul 2021 11:38) (62 - 76)  BP: 168/84 (04 Jul 2021 11:38) (113/68 - 174/79)  BP(mean): --  RR: 17 (04 Jul 2021 11:38) (15 - 18)  SpO2: 95% (04 Jul 2021 11:38) (92% - 97%)    PHYSICAL EXAM:  GENERAL: NAD, well-groomed, well-developed  HEENT : Conjuntivae  clear sclerae anicteric  NECK: Supple, No JVD, Normal thyroid  NERVOUS SYSTEM:  Alert oriented   no  focal  deficits;   CHEST/LUNG: Clear    HEART: Regular rate and rhythm; No murmurs, rubs, or gallops  ABDOMEN: Soft, Nontender, Nondistended; Bowel sounds present  EXTREMITIES:  no  edema no  tenderness  SKIN: No rashes   LABS:                        14.2   7.88  )-----------( 234      ( 03 Jul 2021 14:08 )             42.3     07-03    139  |  107  |  14  ----------------------------<  132<H>  4.7   |  28  |  0.72    Ca    8.9      03 Jul 2021 14:08    TPro  7.0  /  Alb  3.4  /  TBili  0.5  /  DBili  x   /  AST  21  /  ALT  21  /  AlkPhos  96  07-03        CAPILLARY BLOOD GLUCOSE      POCT Blood Glucose.: 149 mg/dL (03 Jul 2021 21:23)  Problem/Plan - 1:  ·  Problem: Dizziness.  Plan: IVF MECLEZINE.  for  mri of  brain    Problem/Plan - 2:  ·  Problem: HTN (hypertension).  Plan: LOSARTAN.     Problem/Plan - 3:  ·  Problem: Unsteady gait.  Plan: MECLEZINE  PT  EVAL.     Problem/Plan - 4:  ·  Problem: LBBB (left bundle branch block).  Plan:  old  cardiology  eval  appreciated    RADIOLOGY & ADDITIONAL TESTS:    Imaging reports  Personally Reviewed:  [ ] YES  [ ] NO    Consultant(s) Notes Reviewed:  [ x] YES  [ ] NO    Care Discussed with Consultants/Other Providers [x ] YES  [ ] NO  
    INTERVAL HPI/OVERNIGHT EVENTS:    awaiting  decision  on  expedited  appaeal    REVIEW OF SYSTEMS:  CONSTITUTIONAL:  no  complaints ambulating  wit  assistance    NECK: No pain or stiffnes  RESPIRATORY: No SOB   CARDIOVASCULAR: No chest pain, palpitations, dizziness,   GASTROINTESTINAL: No abdominal pain. No nausea, vomiting,   NEUROLOGICAL: No headaches, no  blurry  vision no  dizziness  SKIN: No itching,   MUSCULOSKELETAL: No pain    MEDICATION:  acetaminophen   Tablet .. 650 milliGRAM(s) Oral every 6 hours PRN  aluminum hydroxide/magnesium hydroxide/simethicone Suspension 30 milliLiter(s) Oral every 4 hours PRN  atorvastatin 40 milliGRAM(s) Oral at bedtime  bisacodyl 5 milliGRAM(s) Oral every 12 hours PRN  dextrose 40% Gel 15 Gram(s) Oral once  dextrose 5%. 1000 milliLiter(s) IV Continuous <Continuous>  dextrose 5%. 1000 milliLiter(s) IV Continuous <Continuous>  dextrose 50% Injectable 25 Gram(s) IV Push once  dextrose 50% Injectable 12.5 Gram(s) IV Push once  dextrose 50% Injectable 25 Gram(s) IV Push once  glucagon  Injectable 1 milliGRAM(s) IntraMuscular once  heparin SubCutaneous Injection - Peds 5000 Unit(s) SubCutaneous every 12 hours  insulin glargine Injectable (LANTUS) 10 Unit(s) SubCutaneous at bedtime  insulin lispro (ADMELOG) corrective regimen sliding scale   SubCutaneous three times a day before meals  losartan 25 milliGRAM(s) Oral daily  meclizine 25 milliGRAM(s) Oral three times a day  metoprolol tartrate 12.5 milliGRAM(s) Oral two times a day  ondansetron Injectable 4 milliGRAM(s) IV Push daily  senna 2 Tablet(s) Oral at bedtime PRN    Vital Signs Last 24 Hrs  T(C): 36.2 (12 Jul 2021 04:37), Max: 36.6 (11 Jul 2021 11:08)  T(F): 97.2 (12 Jul 2021 04:37), Max: 97.9 (12 Jul 2021 00:11)  HR: 66 (12 Jul 2021 04:37) (66 - 74)  BP: 133/76 (12 Jul 2021 04:37) (113/79 - 152/62)  BP(mean): --  RR: 18 (12 Jul 2021 04:37) (18 - 18)  SpO2: 95% (12 Jul 2021 04:37) (93% - 97%)    PHYSICAL EXAM:  GENERAL: NAD, well-groomed, well-developed  HEENT : Conjuntivae  clear sclerae anicteric  NECK: Supple, No JVD, Normal thyroid  NERVOUS SYSTEM:  Alert oriented   no  focal  deficits;   CHEST/LUNG: Clear    HEART: Regular rate and rhythm; No murmurs, rubs, or gallops  ABDOMEN: Soft, Nontender, Nondistended; Bowel sounds present  EXTREMITIES:  no  edema no  tenderness  SKIN: No rashes   LABS:              CAPILLARY BLOOD GLUCOSE      POCT Blood Glucose.: 189 mg/dL (11 Jul 2021 21:23)  POCT Blood Glucose.: 212 mg/dL (11 Jul 2021 16:08)  POCT Blood Glucose.: 277 mg/dL (11 Jul 2021 11:27)  POCT Blood Glucose.: 184 mg/dL (11 Jul 2021 07:25)      RADIOLOGY & ADDITIONAL TESTS:    Imaging reports  Personally Reviewed:  [ ] YES  [ ] NO    Consultant(s) Notes Reviewed:  [ ] YES  [ ] NO    Care Discussed with Consultants/Other Providers [x ] YES  [ ] NO  Care Discussed with Consultants/Other Providers [x ] YES  [ ] NO  Problem/Plan - 1:  ·  Problem: Dizziness.  Plan: IVF MECLEZINE.      Problem/Plan - 2:  ·  Problem: HTN (hypertension).  Plan: LOSARTAN.     Problem/Plan - 3:  ·  Problem: Unsteady gait.  Plan: MECLEZINE  PT  EVAL.     Problem/Plan - 4:  ·  Problem: LBBB (left bundle branch block).  OLD observation     awaiting  expadited  appeal  dcision for  rehab
    INTERVAL HPI/OVERNIGHT EVENTS:  awaiting  transfer  to  [facility]      REVIEW OF SYSTEMS:  CONSTITUTIONAL:  no  complaints    NECK: No pain or stiffnes  RESPIRATORY: No SOB   CARDIOVASCULAR: No chest pain, palpitations, dizziness,   GASTROINTESTINAL: No abdominal pain. No nausea, vomiting,   NEUROLOGICAL: No headaches, no  blurry  vision no  dizziness  SKIN: No itching,   MUSCULOSKELETAL: No pain    MEDICATION:  aluminum hydroxide/magnesium hydroxide/simethicone Suspension 30 milliLiter(s) Oral every 4 hours PRN  atorvastatin 40 milliGRAM(s) Oral at bedtime  dextrose 40% Gel 15 Gram(s) Oral once  dextrose 5%. 1000 milliLiter(s) IV Continuous <Continuous>  dextrose 5%. 1000 milliLiter(s) IV Continuous <Continuous>  dextrose 50% Injectable 25 Gram(s) IV Push once  dextrose 50% Injectable 12.5 Gram(s) IV Push once  dextrose 50% Injectable 25 Gram(s) IV Push once  glucagon  Injectable 1 milliGRAM(s) IntraMuscular once  heparin SubCutaneous Injection - Peds 5000 Unit(s) SubCutaneous every 12 hours  insulin glargine Injectable (LANTUS) 10 Unit(s) SubCutaneous at bedtime  insulin lispro (ADMELOG) corrective regimen sliding scale   SubCutaneous three times a day before meals  losartan 25 milliGRAM(s) Oral daily  meclizine 25 milliGRAM(s) Oral three times a day  metoprolol tartrate 12.5 milliGRAM(s) Oral two times a day  ondansetron Injectable 4 milliGRAM(s) IV Push daily    Vital Signs Last 24 Hrs  T(C): 36.2 (07 Jul 2021 05:20), Max: 36.5 (06 Jul 2021 12:42)  T(F): 97.2 (07 Jul 2021 05:20), Max: 97.7 (06 Jul 2021 12:42)  HR: 64 (07 Jul 2021 05:20) (64 - 80)  BP: 108/58 (07 Jul 2021 05:20) (108/58 - 137/78)  BP(mean): --  RR: 18 (07 Jul 2021 05:20) (16 - 18)  SpO2: 98% (07 Jul 2021 05:20) (93% - 99%)    PHYSICAL EXAM:  GENERAL: NAD, well-groomed, well-developed  HEENT : Conjuntivae  clear sclerae anicteric  NECK: Supple, No JVD, Normal thyroid  NERVOUS SYSTEM:  Alert oriented   no  focal  deficits;   CHEST/LUNG: Clear    HEART: Regular rate and rhythm; No murmurs, rubs, or gallops  ABDOMEN: Soft, Nontender, Nondistended; Bowel sounds present  EXTREMITIES:  no  edema no  tenderness  SKIN: No rashes   LABS:              CAPILLARY BLOOD GLUCOSE      POCT Blood Glucose.: 272 mg/dL (06 Jul 2021 21:42)  POCT Blood Glucose.: 134 mg/dL (06 Jul 2021 18:08)  POCT Blood Glucose.: 152 mg/dL (06 Jul 2021 15:52)  POCT Blood Glucose.: 349 mg/dL (06 Jul 2021 10:53)      RADIOLOGY & ADDITIONAL TESTS:    Imaging reports  Personally Reviewed:  [ ] YES  [ ] NO    Consultant(s) Notes Reviewed:  [x ] YES  [ ] NO    Care Discussed with Consultants/Other Providers [x ] YES  [ ] NO  Problem/Plan - 1:  ·  Problem: Dizziness.  Plan: IVF MECLEZINE.      Problem/Plan - 2:  ·  Problem: HTN (hypertension).  Plan: LOSARTAN.     Problem/Plan - 3:  ·  Problem: Unsteady gait.  Plan: MECLEZINE  PT  EVAL.     Problem/Plan - 4:  ·  Problem: LBBB (left bundle branch block).  Plan:  old  cardiology  eval  appreciated for  TTE   disc  "harge to str  .
    INTERVAL HPI/OVERNIGHT EVENTS:    lost  appeal  for  rehab    REVIEW OF SYSTEMS:  CONSTITUTIONAL:  fatigue    NECK: No pain or stiffnes  RESPIRATORY: No SOB   CARDIOVASCULAR: No chest pain, palpitations, dizziness,   GASTROINTESTINAL: No abdominal pain. No nausea, vomiting, +  constipation  NEUROLOGICAL: No headaches, no  blurry  vision no  dizziness  SKIN: No itching,   MUSCULOSKELETAL: No pain    MEDICATION:  acetaminophen   Tablet .. 650 milliGRAM(s) Oral every 6 hours PRN  aluminum hydroxide/magnesium hydroxide/simethicone Suspension 30 milliLiter(s) Oral every 4 hours PRN  atorvastatin 40 milliGRAM(s) Oral at bedtime  bisacodyl 5 milliGRAM(s) Oral every 12 hours PRN  dextrose 40% Gel 15 Gram(s) Oral once  dextrose 5%. 1000 milliLiter(s) IV Continuous <Continuous>  dextrose 5%. 1000 milliLiter(s) IV Continuous <Continuous>  dextrose 50% Injectable 25 Gram(s) IV Push once  dextrose 50% Injectable 12.5 Gram(s) IV Push once  dextrose 50% Injectable 25 Gram(s) IV Push once  glucagon  Injectable 1 milliGRAM(s) IntraMuscular once  heparin SubCutaneous Injection - Peds 5000 Unit(s) SubCutaneous every 12 hours  insulin glargine Injectable (LANTUS) 10 Unit(s) SubCutaneous at bedtime  insulin lispro (ADMELOG) corrective regimen sliding scale   SubCutaneous three times a day before meals  losartan 25 milliGRAM(s) Oral daily  meclizine 25 milliGRAM(s) Oral three times a day  metoprolol tartrate 12.5 milliGRAM(s) Oral two times a day  ondansetron Injectable 4 milliGRAM(s) IV Push daily  senna 2 Tablet(s) Oral at bedtime PRN    Vital Signs Last 24 Hrs  T(C): 36.3 (13 Jul 2021 05:03), Max: 36.7 (12 Jul 2021 17:02)  T(F): 97.4 (13 Jul 2021 05:03), Max: 98.1 (12 Jul 2021 17:02)  HR: 62 (13 Jul 2021 05:03) (62 - 78)  BP: 111/68 (13 Jul 2021 05:03) (106/59 - 168/71)  BP(mean): --  RR: 16 (13 Jul 2021 05:03) (16 - 18)  SpO2: 95% (13 Jul 2021 05:03) (95% - 100%)    PHYSICAL EXAM:  GENERAL: NAD, well-groomed, well-developed  HEENT : Conjuntivae  clear sclerae anicteric  NECK: Supple, No JVD, Normal thyroid  NERVOUS SYSTEM:  Alert oriented   no  focal  deficits;   CHEST/LUNG: Clear    HEART: Regular rate and rhythm; No murmurs, rubs, or gallops  ABDOMEN: Soft, Nontender, Nondistended; Bowel sounds present  EXTREMITIES:  no  edema no  tenderness  SKIN: No rashes   LABS:              CAPILLARY BLOOD GLUCOSE      POCT Blood Glucose.: 143 mg/dL (13 Jul 2021 07:27)  POCT Blood Glucose.: 179 mg/dL (12 Jul 2021 20:59)  POCT Blood Glucose.: 182 mg/dL (12 Jul 2021 15:40)  POCT Blood Glucose.: 296 mg/dL (12 Jul 2021 10:58)      RADIOLOGY & ADDITIONAL TESTS:    Imaging reports  Personally Reviewed:  [ ] YES  [ ] NO    Consultant(s) Notes Reviewed:  [x ] YES  [ ] NO    Care Discussed with Consultants/Other Providers [x ] YES  [ ] NO  Problem/Plan - 1:  ·  Problem: Dizziness.  Plan: IVF MECLEZINE.      Problem/Plan - 2:  ·  Problem: HTN (hypertension).  Plan: LOSARTAN.     Problem/Plan - 3:  ·  Problem: Unsteady gait.  Plan: MECLEZINE  PT  EVAL.     Problem/Plan - 4:  ·  Problem: LBBB (left bundle branch block).  OLD observation  dischage  home  with  home PT
    INTERVAL HPI/OVERNIGHT EVENTS:        REVIEW OF SYSTEMS:  CONSTITUTIONAL:  fatigue dizziness  nausea    NECK: No pain or stiffnes  RESPIRATORY: No SOB   CARDIOVASCULAR: No chest pain, palpitations,   GASTROINTESTINAL: No abdominal pain. No  vomiting,   NEUROLOGICAL: No headaches, no  blurry  vision no  dizziness  SKIN: No itching,   MUSCULOSKELETAL: No pain    MEDICATION:  aluminum hydroxide/magnesium hydroxide/simethicone Suspension 30 milliLiter(s) Oral every 4 hours PRN  atorvastatin 40 milliGRAM(s) Oral at bedtime  dextrose 40% Gel 15 Gram(s) Oral once  dextrose 5%. 1000 milliLiter(s) IV Continuous <Continuous>  dextrose 5%. 1000 milliLiter(s) IV Continuous <Continuous>  dextrose 50% Injectable 25 Gram(s) IV Push once  dextrose 50% Injectable 12.5 Gram(s) IV Push once  dextrose 50% Injectable 25 Gram(s) IV Push once  glucagon  Injectable 1 milliGRAM(s) IntraMuscular once  heparin SubCutaneous Injection - Peds 5000 Unit(s) SubCutaneous every 12 hours  insulin glargine Injectable (LANTUS) 10 Unit(s) SubCutaneous at bedtime  insulin lispro (ADMELOG) corrective regimen sliding scale   SubCutaneous three times a day before meals  losartan 25 milliGRAM(s) Oral daily  meclizine 25 milliGRAM(s) Oral three times a day  metoprolol tartrate 12.5 milliGRAM(s) Oral two times a day  ondansetron Injectable 4 milliGRAM(s) IV Push daily    Vital Signs Last 24 Hrs  T(C): 36.8 (06 Jul 2021 05:21), Max: 36.8 (06 Jul 2021 05:21)  T(F): 98.2 (06 Jul 2021 05:21), Max: 98.2 (06 Jul 2021 05:21)  HR: 79 (06 Jul 2021 05:21) (68 - 80)  BP: 122/72 (06 Jul 2021 05:21) (122/72 - 138/76)  BP(mean): --  RR: 18 (06 Jul 2021 05:21) (18 - 18)  SpO2: 96% (06 Jul 2021 05:21) (93% - 98%)    PHYSICAL EXAM:  GENERAL: NAD, well-groomed, well-developed  HEENT : Conjuntivae  clear sclerae anicteric  NECK: Supple, No JVD, Normal thyroid  NERVOUS SYSTEM:  Alert oriented   no  focal  deficits;   CHEST/LUNG: Clear    HEART: Regular rate and rhythm; No murmurs, rubs, or gallops  ABDOMEN: Soft, Nontender, Nondistended; Bowel sounds present  EXTREMITIES:  no  edema no  tenderness  SKIN: No rashes   LABS:              CAPILLARY BLOOD GLUCOSE      POCT Blood Glucose.: 190 mg/dL (06 Jul 2021 07:28)  POCT Blood Glucose.: 197 mg/dL (05 Jul 2021 21:15)  POCT Blood Glucose.: 189 mg/dL (05 Jul 2021 15:32)      RADIOLOGY & ADDITIONAL TESTS:    Imaging reports  Personally Reviewed:  [ x] YES  [ ] NO    Consultant(s) Notes Reviewed:  [ x] YES  [ ] NO    Care Discussed with Consultants/Other Providers [ x] YES  [ ] NO  Problem/Plan - 1:  ·  Problem: Dizziness.  Plan: IVF MECLEZINE.      Problem/Plan - 2:  ·  Problem: HTN (hypertension).  Plan: LOSARTAN.     Problem/Plan - 3:  ·  Problem: Unsteady gait.  Plan: MECLEZINE  PT  EVAL.     Problem/Plan - 4:  ·  Problem: LBBB (left bundle branch block).  Plan:  old  cardiology  eval  appreciated for  TTE   discharge to str      
    INTERVAL HPI/OVERNIGHT EVENTS:    awaiting  discharge  to  rehab    REVIEW OF SYSTEMS:  CONSTITUTION dizziness with  movement    NECK: No pain or stiffnes  RESPIRATORY: No SOB   CARDIOVASCULAR: No chest pain, palpitations, dizziness,   GASTROINTESTINAL: No abdominal pain. No nausea, vomiting,   NEUROLOGICAL: No headaches, no  blurry  vision no  dizziness  SKIN: No itching,   MUSCULOSKELETAL: No pain    MEDICATION:  acetaminophen   Tablet .. 650 milliGRAM(s) Oral every 6 hours PRN  aluminum hydroxide/magnesium hydroxide/simethicone Suspension 30 milliLiter(s) Oral every 4 hours PRN  atorvastatin 40 milliGRAM(s) Oral at bedtime  dextrose 40% Gel 15 Gram(s) Oral once  dextrose 5%. 1000 milliLiter(s) IV Continuous <Continuous>  dextrose 5%. 1000 milliLiter(s) IV Continuous <Continuous>  dextrose 50% Injectable 25 Gram(s) IV Push once  dextrose 50% Injectable 12.5 Gram(s) IV Push once  dextrose 50% Injectable 25 Gram(s) IV Push once  glucagon  Injectable 1 milliGRAM(s) IntraMuscular once  heparin SubCutaneous Injection - Peds 5000 Unit(s) SubCutaneous every 12 hours  insulin glargine Injectable (LANTUS) 10 Unit(s) SubCutaneous at bedtime  insulin lispro (ADMELOG) corrective regimen sliding scale   SubCutaneous three times a day before meals  losartan 25 milliGRAM(s) Oral daily  meclizine 25 milliGRAM(s) Oral three times a day  metoprolol tartrate 12.5 milliGRAM(s) Oral two times a day  ondansetron Injectable 4 milliGRAM(s) IV Push daily    Vital Signs Last 24 Hrs  T(C): 36.5 (09 Jul 2021 05:17), Max: 36.9 (08 Jul 2021 17:00)  T(F): 97.7 (09 Jul 2021 05:17), Max: 98.5 (08 Jul 2021 17:00)  HR: 66 (09 Jul 2021 05:17) (66 - 80)  BP: 131/66 (09 Jul 2021 05:17) (119/67 - 152/85)  BP(mean): --  RR: 18 (09 Jul 2021 05:17) (18 - 18)  SpO2: 96% (09 Jul 2021 05:17) (94% - 97%)    PHYSICAL EXAM:  GENERAL: NAD, well-groomed, well-developed  HEENT : Conjuntivae  clear sclerae anicteric  NECK: Supple, No JVD, Normal thyroid  NERVOUS SYSTEM:  Alert oriented   no  focal  deficits;   CHEST/LUNG: Clear    HEART: Regular rate and rhythm; No murmurs, rubs, or gallops  ABDOMEN: Soft, Nontender, Nondistended; Bowel sounds present  EXTREMITIES:  no  edema no  tenderness  SKIN: No rashes   LABS:              CAPILLARY BLOOD GLUCOSE      POCT Blood Glucose.: 160 mg/dL (08 Jul 2021 21:16)  POCT Blood Glucose.: 213 mg/dL (08 Jul 2021 16:26)  POCT Blood Glucose.: 308 mg/dL (08 Jul 2021 11:54)  POCT Blood Glucose.: 339 mg/dL (08 Jul 2021 10:45)  POCT Blood Glucose.: 168 mg/dL (08 Jul 2021 07:42)      RADIOLOGY & ADDITIONAL TESTS:    Imaging reports  Personally Reviewed:  [ ] YES  [ ] NO    Consultant(s) Notes Reviewed:  [ x] YES  [ ] NO    Care Discussed with Consultants/Other Providers [ x] YES  [ ] NO  Problem/Plan - 1:  ·  Problem: Dizziness.  Plan: IVF MECLEZINE.      Problem/Plan - 2:  ·  Problem: HTN (hypertension).  Plan: LOSARTAN.     Problem/Plan - 3:  ·  Problem: Unsteady gait.  Plan: MECLEZINE  PT  EVAL.     Problem/Plan - 4:  ·  Problem: LBBB (left bundle branch block).  OLD observation     discharge to [facility]
    INTERVAL HPI/OVERNIGHT EVENTS:  patient  off  floor  for  testing        REVIEW OF SYSTEMS:  CONSTITUTIONAL:  reported  with  continued  symptoms  of   dizines        MEDICATION:  heparin SubCutaneous Injection - Peds 5000 Unit(s) SubCutaneous every 12 hours  losartan 50 milliGRAM(s) Oral daily  meclizine 25 milliGRAM(s) Oral three times a day    Vital Signs Last 24 Hrs  T(C): 36.4 (05 Jul 2021 05:03), Max: 36.7 (04 Jul 2021 17:14)  T(F): 97.5 (05 Jul 2021 05:03), Max: 98 (04 Jul 2021 17:14)  HR: 75 (05 Jul 2021 05:03) (70 - 77)  BP: 140/82 (05 Jul 2021 05:03) (134/66 - 168/84)  BP(mean): --  RR: 18 (05 Jul 2021 05:03) (17 - 18)  SpO2: 97% (05 Jul 2021 05:03) (93% - 97%)    PHYSICAL EXAM:  LABS:                        14.2   7.88  )-----------( 234      ( 03 Jul 2021 14:08 )             42.3     07-03    139  |  107  |  14  ----------------------------<  132<H>  4.7   |  28  |  0.72    Ca    8.9      03 Jul 2021 14:08    TPro  7.0  /  Alb  3.4  /  TBili  0.5  /  DBili  x   /  AST  21  /  ALT  21  /  AlkPhos  96  07-03        CAPILLARY BLOOD GLUCOSE          RADIOLOGY & ADDITIONAL TESTS:    Imaging reports  Personally Reviewed:  [ ] YES  [ ] NO    Consultant(s) Notes Reviewed:  [x ] YES  [ ] NO    Care Discussed with Consultants/Other Providers [x ] YES  [ ] NO  Problem/Plan - 1:  ·  Problem: Dizziness.  Plan: IVF MECLEZINE.  for  mri of  brain    Problem/Plan - 2:  ·  Problem: HTN (hypertension).  Plan: LOSARTAN.     Problem/Plan - 3:  ·  Problem: Unsteady gait.  Plan: MECLEZINE  PT  EVAL.     Problem/Plan - 4:  ·  Problem: LBBB (left bundle branch block).  Plan:  old  cardiology  eval  appreciated for  TTE  check  MRI  TTE  results  furhter  w/u  and  rx  as per  clincal  course

## 2021-07-13 NOTE — PROGRESS NOTE ADULT - REASON FOR ADMISSION
unsteady  gait  dizziness

## 2021-07-13 NOTE — PROGRESS NOTE ADULT - PROVIDER SPECIALTY LIST ADULT
Internal Medicine
Neurology
Neurology
Internal Medicine
Internal Medicine
Neurology
Internal Medicine
Neurology
Internal Medicine

## 2021-07-19 DIAGNOSIS — R06.02 SHORTNESS OF BREATH: ICD-10-CM

## 2021-07-19 DIAGNOSIS — R26.81 UNSTEADINESS ON FEET: ICD-10-CM

## 2021-07-19 DIAGNOSIS — R11.10 VOMITING, UNSPECIFIED: ICD-10-CM

## 2021-07-19 DIAGNOSIS — E11.9 TYPE 2 DIABETES MELLITUS WITHOUT COMPLICATIONS: ICD-10-CM

## 2021-07-19 DIAGNOSIS — I44.7 LEFT BUNDLE-BRANCH BLOCK, UNSPECIFIED: ICD-10-CM

## 2021-07-19 DIAGNOSIS — E78.5 HYPERLIPIDEMIA, UNSPECIFIED: ICD-10-CM

## 2021-07-19 DIAGNOSIS — I10 ESSENTIAL (PRIMARY) HYPERTENSION: ICD-10-CM

## 2021-07-19 DIAGNOSIS — Z79.84 LONG TERM (CURRENT) USE OF ORAL HYPOGLYCEMIC DRUGS: ICD-10-CM

## 2021-07-19 DIAGNOSIS — R42 DIZZINESS AND GIDDINESS: ICD-10-CM

## 2021-07-19 DIAGNOSIS — E44.0 MODERATE PROTEIN-CALORIE MALNUTRITION: ICD-10-CM

## 2021-08-27 PROBLEM — E11.9 TYPE 2 DIABETES MELLITUS WITHOUT COMPLICATIONS: Chronic | Status: ACTIVE | Noted: 2021-07-03

## 2021-08-27 PROBLEM — I10 ESSENTIAL (PRIMARY) HYPERTENSION: Chronic | Status: ACTIVE | Noted: 2021-07-03

## 2021-10-11 ENCOUNTER — NON-APPOINTMENT (OUTPATIENT)
Age: 86
End: 2021-10-11

## 2021-10-11 ENCOUNTER — APPOINTMENT (OUTPATIENT)
Dept: CARDIOLOGY | Facility: CLINIC | Age: 86
End: 2021-10-11
Payer: MEDICARE

## 2021-10-11 VITALS
HEART RATE: 70 BPM | DIASTOLIC BLOOD PRESSURE: 70 MMHG | BODY MASS INDEX: 25.61 KG/M2 | OXYGEN SATURATION: 96 % | HEIGHT: 64 IN | SYSTOLIC BLOOD PRESSURE: 142 MMHG | WEIGHT: 150 LBS

## 2021-10-11 PROCEDURE — 93000 ELECTROCARDIOGRAM COMPLETE: CPT

## 2021-10-11 PROCEDURE — 99214 OFFICE O/P EST MOD 30 MIN: CPT

## 2021-10-11 RX ORDER — ASPIRIN ENTERIC COATED TABLETS 81 MG 81 MG/1
81 TABLET, DELAYED RELEASE ORAL DAILY
Qty: 90 | Refills: 3 | Status: ACTIVE | COMMUNITY
Start: 2021-10-11

## 2021-10-11 RX ORDER — MECLIZINE HYDROCHLORIDE 25 MG/1
25 TABLET ORAL
Qty: 90 | Refills: 0 | Status: ACTIVE | COMMUNITY
Start: 2021-08-18

## 2021-10-11 RX ORDER — LOSARTAN POTASSIUM 50 MG/1
50 TABLET, FILM COATED ORAL DAILY
Refills: 0 | Status: DISCONTINUED | COMMUNITY
End: 2021-10-11

## 2021-10-11 RX ORDER — LOSARTAN POTASSIUM 25 MG/1
25 TABLET, FILM COATED ORAL
Qty: 180 | Refills: 0 | Status: ACTIVE | COMMUNITY
Start: 2021-07-27

## 2021-10-11 RX ORDER — REPAGLINIDE 1 MG/1
1 TABLET ORAL
Refills: 0 | Status: ACTIVE | COMMUNITY
Start: 2021-10-11

## 2021-10-11 RX ORDER — INSULIN GLARGINE 100 [IU]/ML
100 INJECTION, SOLUTION SUBCUTANEOUS
Qty: 4 | Refills: 5 | Status: ACTIVE | COMMUNITY
Start: 2021-10-11

## 2021-10-12 ENCOUNTER — RX RENEWAL (OUTPATIENT)
Age: 86
End: 2021-10-12

## 2021-12-25 NOTE — DISCUSSION/SUMMARY
[___ Week(s)] : in [unfilled] week(s) [FreeTextEntry1] : Continue on glycemic lowering medications for type 2 diabetes control.  Stay on losartan and metoprolol for blood pressure management.  Continue on simvastatin and aspirin for cardiovascular risk reduction and lipid-lowering.  Diuretics to be taken as directed for ankle edema reduction.  No cardiac testing indicated at present.  Follow home blood pressure readings with a home blood pressure device and alert us if there are any concerning trends.  Age-appropriate fitness and a low-fat low-cholesterol low-salt diet encouraged.  Follow-up with you for care and see me in 3 months or sooner if needed.

## 2021-12-25 NOTE — REASON FOR VISIT
[Abnormal ECG] : an abnormal ECG [Dyspnea] : dyspnea [Hyperlipidemia] : hyperlipidemia [Symptom and Test Evaluation] : symptom and test evaluation [Follow-Up - Clinic] : a clinic follow-up of

## 2021-12-25 NOTE — CARDIOLOGY SUMMARY
[de-identified] : Sinus  Rhythm \par -Left bundle branch block. \par Nonspecific ST-T wave abnormalities.\par \par ABNORMAL

## 2021-12-25 NOTE — HISTORY OF PRESENT ILLNESS
[FreeTextEntry1] : She is a pleasant 88-year-old with hypertension, dyslipidemia, chronic left bundle branch block, type 2 diabetes and ankle edema times who comes to the office for cardiac follow-up.  Available labs and cardiac data reviewed.  No active chest complaints reported.

## 2022-01-18 ENCOUNTER — APPOINTMENT (OUTPATIENT)
Dept: CARDIOLOGY | Facility: CLINIC | Age: 87
End: 2022-01-18

## 2022-04-19 ENCOUNTER — NON-APPOINTMENT (OUTPATIENT)
Age: 87
End: 2022-04-19

## 2022-04-19 ENCOUNTER — APPOINTMENT (OUTPATIENT)
Dept: CARDIOLOGY | Facility: CLINIC | Age: 87
End: 2022-04-19
Payer: MEDICARE

## 2022-04-19 VITALS
HEIGHT: 64 IN | OXYGEN SATURATION: 97 % | SYSTOLIC BLOOD PRESSURE: 152 MMHG | WEIGHT: 150 LBS | HEART RATE: 70 BPM | DIASTOLIC BLOOD PRESSURE: 80 MMHG | BODY MASS INDEX: 25.61 KG/M2

## 2022-04-19 DIAGNOSIS — R25.2 CRAMP AND SPASM: ICD-10-CM

## 2022-04-19 DIAGNOSIS — Z86.39 PERSONAL HISTORY OF OTHER ENDOCRINE, NUTRITIONAL AND METABOLIC DISEASE: ICD-10-CM

## 2022-04-19 DIAGNOSIS — I83.899 VARICOSE VEINS OF UNSPECIFIED LOWER EXTREMITY WITH OTHER COMPLICATIONS: ICD-10-CM

## 2022-04-19 DIAGNOSIS — R42 DIZZINESS AND GIDDINESS: ICD-10-CM

## 2022-04-19 PROCEDURE — 99214 OFFICE O/P EST MOD 30 MIN: CPT

## 2022-04-19 PROCEDURE — 93000 ELECTROCARDIOGRAM COMPLETE: CPT

## 2022-04-20 PROBLEM — Z86.39 HISTORY OF TYPE 2 DIABETES MELLITUS: Status: RESOLVED | Noted: 2020-06-30 | Resolved: 2022-04-20

## 2022-04-20 NOTE — HISTORY OF PRESENT ILLNESS
[FreeTextEntry1] : Layne is a pleasant 88-year-old female with type 2 diabetes, dyslipidemia, hypertension, left bundle branch block, who has been experiencing increasing exertional dyspnea, limited chest pain and bilateral lower extremity ankle and leg edema.  She is here today with her adult daughter.  Available labs and cardiac data reviewed.  Its been sometime since she has had a cardiac work-up. She has also been experiencing some lightheadedness.

## 2022-04-20 NOTE — REASON FOR VISIT
[Symptom and Test Evaluation] : symptom and test evaluation [Follow-Up - Clinic] : a clinic follow-up of [Abnormal ECG] : an abnormal ECG [Dyspnea] : dyspnea [Hyperlipidemia] : hyperlipidemia [Hypertension] : hypertension

## 2022-04-20 NOTE — DISCUSSION/SUMMARY
[___ Week(s)] : in [unfilled] week(s) [FreeTextEntry1] : Continue on current antihypertensive regimen of losartan and metoprolol.  Continue simvastatin for lipid lowering.  Stay on aspirin for cardiac risk reduction.  Lasix as directed for ankle edema control.  Continue glycemic management for type 2 diabetes.  I have ordered an echocardiogram to assess LV function and valvular status.  To assess underlying coronary disease burden with current cardiovascular disease risk factors and symptoms, I have ordered a pharmacologic nuclear perfusion stress test.  Lower extremity bilateral arterial and venous Dopplers ordered to assess lower extremity circulation given diminished bilateral pedal pulses, bilateral cramping leg pains and ankle edema along with significant venous varicosities bilaterally.  Sodium and starch restriction emphasized.  Age-appropriate fitness and a low-fat, low-cholesterol, low-salt diet indicated.  We will call the patient with test results and followup with you for general care.  Otherwise follow-up in approximately 1 month to review findings or sooner should the need arise.

## 2022-04-20 NOTE — PHYSICAL EXAM
[Well Developed] : well developed [Well Nourished] : well nourished [No Acute Distress] : no acute distress [Normal Conjunctiva] : normal conjunctiva [Normal Venous Pressure] : normal venous pressure [No Carotid Bruit] : no carotid bruit [No Murmur] : no murmur [Normal S1, S2] : normal S1, S2 [No Rub] : no rub [No Gallop] : no gallop [Clear Lung Fields] : clear lung fields [No Respiratory Distress] : no respiratory distress  [Good Air Entry] : good air entry [Soft] : abdomen soft [Non Tender] : non-tender [No Masses/organomegaly] : no masses/organomegaly [Normal Bowel Sounds] : normal bowel sounds [Normal Gait] : normal gait [No Cyanosis] : no cyanosis [No Clubbing] : no clubbing [No Varicosities] : no varicosities [No Skin Lesions] : no skin lesions [No Rash] : no rash [Moves all extremities] : moves all extremities [No Focal Deficits] : no focal deficits [Normal Speech] : normal speech [Alert and Oriented] : alert and oriented [Normal memory] : normal memory [1+] : left 1+ [Edema ___] : edema [unfilled] [Venous stasis] : venous stasis [Venous varicosities] : venous varicosities [Right Carotid Bruit] : no bruit heard over the right carotid [Left Carotid Bruit] : no bruit heard over the left carotid

## 2022-05-03 ENCOUNTER — APPOINTMENT (OUTPATIENT)
Dept: CARDIOLOGY | Facility: CLINIC | Age: 87
End: 2022-05-03
Payer: MEDICARE

## 2022-05-03 PROCEDURE — 93306 TTE W/DOPPLER COMPLETE: CPT

## 2022-05-03 PROCEDURE — 93970 EXTREMITY STUDY: CPT

## 2022-05-05 ENCOUNTER — MED ADMIN CHARGE (OUTPATIENT)
Age: 87
End: 2022-05-05

## 2022-05-05 ENCOUNTER — APPOINTMENT (OUTPATIENT)
Dept: CARDIOLOGY | Facility: CLINIC | Age: 87
End: 2022-05-05
Payer: MEDICARE

## 2022-05-05 PROCEDURE — 78452 HT MUSCLE IMAGE SPECT MULT: CPT

## 2022-05-05 PROCEDURE — 93015 CV STRESS TEST SUPVJ I&R: CPT

## 2022-05-05 PROCEDURE — A9500: CPT

## 2022-05-05 RX ORDER — REGADENOSON 0.08 MG/ML
0.4 INJECTION, SOLUTION INTRAVENOUS
Qty: 1 | Refills: 0 | Status: COMPLETED | OUTPATIENT
Start: 2022-05-05

## 2022-05-05 RX ADMIN — REGADENOSON 4 MG/5ML: 0.08 INJECTION, SOLUTION INTRAVENOUS at 00:00

## 2022-05-06 ENCOUNTER — APPOINTMENT (OUTPATIENT)
Dept: CARDIOLOGY | Facility: CLINIC | Age: 87
End: 2022-05-06
Payer: MEDICARE

## 2022-05-06 PROCEDURE — 93925 LOWER EXTREMITY STUDY: CPT

## 2022-11-02 NOTE — ED ADULT NURSE NOTE - NSHOSCREENINGQ1_ED_ALL_ED
Pharmacokinetic Assessment Follow Up: IV Vancomycin    Vancomycin serum concentration assessment(s):    The random level was drawn correctly and can be used to guide therapy at this time. The measurement is below the desired definitive target range of 10 to 20 mcg/mL.    Vancomycin Regimen Plan:    Change regimen to Vancomycin 1000 mg IV every 12 hours with next serum trough concentration measured at 1030 prior to 1100 dose on 11/4    Drug levels (last 3 results):  Recent Labs   Lab Result Units 11/02/22  0645   Vancomycin, Random ug/mL 5.7       Pharmacy will continue to follow and monitor vancomycin.    Please contact pharmacy at extension [phone] for questions regarding this assessment.    Thank you for the consult,   Ruthann Allen       Patient brief summary:  Brigitte Sorto is a 64 y.o. female initiated on antimicrobial therapy with IV Vancomycin for treatment of urinary tract infection    The patient's current regimen is Vancomycin 1000mg IVPB every 12 hours    Drug Allergies:   Review of patient's allergies indicates:   Allergen Reactions    Amoxicillin        Actual Body Weight:   78kg    Renal Function:   Estimated Creatinine Clearance: 83.8 mL/min (based on SCr of 0.7 mg/dL).,     Dialysis Method (if applicable):  N/A    CBC (last 72 hours):  Recent Labs   Lab Result Units 11/01/22  0912 11/02/22  0645   WBC K/uL 17.56* 13.75*   Hemoglobin g/dL 11.2* 9.7*   Hematocrit % 34.8* 30.4*   Platelets K/uL 186 142*   Gran % % 86.9* 79.9*   Lymph % % 6.2* 9.6*   Mono % % 5.6 9.2   Eosinophil % % 0.2 0.2   Basophil % % 0.2 0.2   Differential Method  Automated Automated       Metabolic Panel (last 72 hours):  Recent Labs   Lab Result Units 11/01/22  0912 11/01/22  1334 11/02/22  0645   Sodium mmol/L 137  --  139   Potassium mmol/L 3.5  --  3.2*   Chloride mmol/L 102  --  110   CO2 mmol/L 21*  --  24   Glucose mg/dL 185*  --  130*   Glucose, UA   --  Negative  --    BUN mg/dL 27*  --  18   Creatinine mg/dL 1.7*  --  0.7    Albumin g/dL 3.1*  --  2.4*   Total Bilirubin mg/dL 2.7*  --  1.1*   Alkaline Phosphatase U/L 379*  --  345*   AST U/L 197*  --  112*   ALT U/L 229*  --  159*   Magnesium mg/dL  --   --  1.6   Phosphorus mg/dL  --   --  2.3*       Vancomycin Administrations:  vancomycin given in the last 96 hours                     vancomycin 1.5 g in dextrose 5 % 250 mL IVPB (ready to mix) (mg) 1,500 mg New Bag 11/01/22 1332                    Microbiologic Results:  Microbiology Results (last 7 days)       Procedure Component Value Units Date/Time    Blood culture x two cultures. Draw prior to antibiotics. [[id number]] Collected: 11/01/22 1035    Order Status: Completed Specimen: Blood Updated: 11/02/22 0502     Blood Culture, Routine Gram stain aer bottle: Gram positive cocci in chains resembling Strep      Gram stain mary jane bottle: Gram positive cocci in chains resembling Strep      Results called to and read back by: Joe Alegre RN 11/02/2022  05:00    Narrative:      Aerobic and anaerobic    Blood culture x two cultures. Draw prior to antibiotics. [[id number]] Collected: 11/01/22 1035    Order Status: Completed Specimen: Blood Updated: 11/02/22 0501     Blood Culture, Routine Gram stain aer bottle: Gram positive cocci in chains resembling Strep      Gram stain mary jane bottle: Gram positive cocci in chains resembling Strep      Results called to and read back by: Joe Alegre RN 11/02/2022  05:00    Narrative:      Aerobic and anaerobic    Urine culture [[id number]] Collected: 11/01/22 1334    Order Status: No result Specimen: Urine Updated: 11/01/22 1446    Influenza A & B by Molecular [[id number]]     Order Status: Canceled Specimen: Nasopharyngeal Swab            No

## 2023-01-04 NOTE — DIETITIAN INITIAL EVALUATION ADULT. - CHIEF COMPLAINT
Received message that patient would like assistance with her authorization number for her upcoming test she wants to have done at [facility]      If someone could assist with getting her auth and calling her back      Thank you The patient is a 87y Female complaining of dizziness.

## 2023-04-17 ENCOUNTER — NON-APPOINTMENT (OUTPATIENT)
Age: 88
End: 2023-04-17

## 2023-04-17 ENCOUNTER — APPOINTMENT (OUTPATIENT)
Dept: CARDIOLOGY | Facility: CLINIC | Age: 88
End: 2023-04-17
Payer: MEDICARE

## 2023-04-17 VITALS
OXYGEN SATURATION: 96 % | SYSTOLIC BLOOD PRESSURE: 160 MMHG | HEART RATE: 77 BPM | DIASTOLIC BLOOD PRESSURE: 68 MMHG | BODY MASS INDEX: 24.92 KG/M2 | HEIGHT: 64 IN | WEIGHT: 146 LBS

## 2023-04-17 VITALS — SYSTOLIC BLOOD PRESSURE: 150 MMHG | DIASTOLIC BLOOD PRESSURE: 78 MMHG

## 2023-04-17 DIAGNOSIS — E66.3 OVERWEIGHT: ICD-10-CM

## 2023-04-17 DIAGNOSIS — R60.0 LOCALIZED EDEMA: ICD-10-CM

## 2023-04-17 DIAGNOSIS — I44.7 LEFT BUNDLE-BRANCH BLOCK, UNSPECIFIED: ICD-10-CM

## 2023-04-17 DIAGNOSIS — I10 ESSENTIAL (PRIMARY) HYPERTENSION: ICD-10-CM

## 2023-04-17 DIAGNOSIS — R94.31 ABNORMAL ELECTROCARDIOGRAM [ECG] [EKG]: ICD-10-CM

## 2023-04-17 PROCEDURE — 99214 OFFICE O/P EST MOD 30 MIN: CPT | Mod: 25

## 2023-04-17 PROCEDURE — 93000 ELECTROCARDIOGRAM COMPLETE: CPT

## 2023-04-17 RX ORDER — ATORVASTATIN CALCIUM 40 MG/1
40 TABLET, FILM COATED ORAL DAILY
Refills: 0 | Status: ACTIVE | COMMUNITY

## 2023-04-17 RX ORDER — FUROSEMIDE 20 MG/1
20 TABLET ORAL
Qty: 90 | Refills: 2 | Status: DISCONTINUED | COMMUNITY
Start: 2020-06-30 | End: 2023-04-17

## 2023-04-17 RX ORDER — SIMVASTATIN 40 MG/1
40 TABLET, FILM COATED ORAL DAILY
Refills: 0 | Status: DISCONTINUED | COMMUNITY
End: 2023-04-17

## 2023-04-17 NOTE — HISTORY OF PRESENT ILLNESS
[FreeTextEntry1] : Pleasant 89-year-old with hypertension, dyslipidemia, type 2 diabetes, left bundle branch block, varicose veins of the legs with circulatory changes noted more concerning is increasing shortness of breath with chest pains.  She has been stressed over her son's recent bypass heart surgery and subsequent debility.  Takes medications as prescribed.

## 2023-04-17 NOTE — PHYSICAL EXAM
[Well Developed] : well developed [Well Nourished] : well nourished [No Acute Distress] : no acute distress [Normal Conjunctiva] : normal conjunctiva [Normal Venous Pressure] : normal venous pressure [No Carotid Bruit] : no carotid bruit [Normal S1, S2] : normal S1, S2 [No Murmur] : no murmur [No Rub] : no rub [No Gallop] : no gallop [1+] : left 1+ [Clear Lung Fields] : clear lung fields [Good Air Entry] : good air entry [No Respiratory Distress] : no respiratory distress  [Soft] : abdomen soft [Non Tender] : non-tender [No Masses/organomegaly] : no masses/organomegaly [Normal Bowel Sounds] : normal bowel sounds [Normal Gait] : normal gait [No Cyanosis] : no cyanosis [No Clubbing] : no clubbing [No Varicosities] : no varicosities [Edema ___] : edema [unfilled] [Venous stasis] : venous stasis [Venous varicosities] : venous varicosities [No Rash] : no rash [No Skin Lesions] : no skin lesions [Moves all extremities] : moves all extremities [No Focal Deficits] : no focal deficits [Normal Speech] : normal speech [Alert and Oriented] : alert and oriented [Normal memory] : normal memory [Right Carotid Bruit] : no bruit heard over the right carotid [Left Carotid Bruit] : no bruit heard over the left carotid

## 2023-04-17 NOTE — DISCUSSION/SUMMARY
[___ Week(s)] : in [unfilled] week(s) [FreeTextEntry3] : Left heart angiogram and cardiac catheterization [FreeTextEntry1] : Continue aspirin and statin for cardiac risk reduction lipid-lowering.  Continue metoprolol for blood pressure management and possibly raise losartan if needed for blood pressure control which is a bit elevated today.  I asked that she follow home blood pressure readings.  We talked about pursuing further cardiac imaging and after risk, benefits, alternative discussion, she is agreeable to proceeding with left cardiac catheterization to define current coronary ischemic burden.  Vascular surgical referral given for lower extremity significant varicose veins bilaterally.  I recommended a heart healthy lifestyle including a low-saturated fat, low cholesterol diet with improved aerobic physical fitness over time for cardiovascular benefits.

## 2023-04-17 NOTE — REASON FOR VISIT
[Symptom and Test Evaluation] : symptom and test evaluation [Hypertension] : hypertension [Follow-Up - Clinic] : a clinic follow-up of [Abnormal ECG] : an abnormal ECG [Dyspnea] : dyspnea [Hyperlipidemia] : hyperlipidemia

## 2023-04-17 NOTE — REVIEW OF SYSTEMS
[Negative] : Heme/Lymph [Feeling Fatigued] : feeling fatigued [Dyspnea on exertion] : dyspnea during exertion [Chest Discomfort] : chest discomfort

## 2023-05-03 NOTE — REVIEW OF SYSTEMS
[Negative] : Heme/Lymph [Dyspnea on exertion] : dyspnea during exertion [Chest Discomfort] : chest discomfort [Lower Ext Edema] : lower extremity edema Accidental fall

## 2023-08-17 ENCOUNTER — TRANSCRIPTION ENCOUNTER (OUTPATIENT)
Age: 88
End: 2023-08-17

## 2023-08-17 ENCOUNTER — OUTPATIENT (OUTPATIENT)
Dept: OUTPATIENT SERVICES | Facility: HOSPITAL | Age: 88
LOS: 1 days | End: 2023-08-17
Payer: MEDICARE

## 2023-08-17 VITALS — HEIGHT: 61 IN | WEIGHT: 145.95 LBS

## 2023-08-17 VITALS
OXYGEN SATURATION: 97 % | SYSTOLIC BLOOD PRESSURE: 145 MMHG | HEART RATE: 60 BPM | DIASTOLIC BLOOD PRESSURE: 65 MMHG | RESPIRATION RATE: 16 BRPM

## 2023-08-17 DIAGNOSIS — R06.02 SHORTNESS OF BREATH: ICD-10-CM

## 2023-08-17 LAB
ANION GAP SERPL CALC-SCNC: 12 MMOL/L — SIGNIFICANT CHANGE UP (ref 5–17)
BUN SERPL-MCNC: 23 MG/DL — SIGNIFICANT CHANGE UP (ref 7–23)
CALCIUM SERPL-MCNC: 9.4 MG/DL — SIGNIFICANT CHANGE UP (ref 8.4–10.5)
CHLORIDE SERPL-SCNC: 103 MMOL/L — SIGNIFICANT CHANGE UP (ref 96–108)
CO2 SERPL-SCNC: 24 MMOL/L — SIGNIFICANT CHANGE UP (ref 22–31)
CREAT SERPL-MCNC: 0.69 MG/DL — SIGNIFICANT CHANGE UP (ref 0.5–1.3)
EGFR: 83 ML/MIN/1.73M2 — SIGNIFICANT CHANGE UP
GLUCOSE SERPL-MCNC: 141 MG/DL — HIGH (ref 70–99)
HCT VFR BLD CALC: 42.9 % — SIGNIFICANT CHANGE UP (ref 34.5–45)
HGB BLD-MCNC: 14.3 G/DL — SIGNIFICANT CHANGE UP (ref 11.5–15.5)
MCHC RBC-ENTMCNC: 31.2 PG — SIGNIFICANT CHANGE UP (ref 27–34)
MCHC RBC-ENTMCNC: 33.3 GM/DL — SIGNIFICANT CHANGE UP (ref 32–36)
MCV RBC AUTO: 93.7 FL — SIGNIFICANT CHANGE UP (ref 80–100)
NRBC # BLD: 0 /100 WBCS — SIGNIFICANT CHANGE UP (ref 0–0)
PLATELET # BLD AUTO: 233 K/UL — SIGNIFICANT CHANGE UP (ref 150–400)
POTASSIUM SERPL-MCNC: 4.6 MMOL/L — SIGNIFICANT CHANGE UP (ref 3.5–5.3)
POTASSIUM SERPL-SCNC: 4.6 MMOL/L — SIGNIFICANT CHANGE UP (ref 3.5–5.3)
RBC # BLD: 4.58 M/UL — SIGNIFICANT CHANGE UP (ref 3.8–5.2)
RBC # FLD: 12.5 % — SIGNIFICANT CHANGE UP (ref 10.3–14.5)
SODIUM SERPL-SCNC: 139 MMOL/L — SIGNIFICANT CHANGE UP (ref 135–145)
WBC # BLD: 6.12 K/UL — SIGNIFICANT CHANGE UP (ref 3.8–10.5)
WBC # FLD AUTO: 6.12 K/UL — SIGNIFICANT CHANGE UP (ref 3.8–10.5)

## 2023-08-17 PROCEDURE — 80048 BASIC METABOLIC PNL TOTAL CA: CPT

## 2023-08-17 PROCEDURE — 93005 ELECTROCARDIOGRAM TRACING: CPT

## 2023-08-17 PROCEDURE — C1769: CPT

## 2023-08-17 PROCEDURE — C1894: CPT

## 2023-08-17 PROCEDURE — 93454 CORONARY ARTERY ANGIO S&I: CPT | Mod: 26

## 2023-08-17 PROCEDURE — 99152 MOD SED SAME PHYS/QHP 5/>YRS: CPT

## 2023-08-17 PROCEDURE — C1887: CPT

## 2023-08-17 PROCEDURE — 85027 COMPLETE CBC AUTOMATED: CPT

## 2023-08-17 PROCEDURE — 93010 ELECTROCARDIOGRAM REPORT: CPT

## 2023-08-17 PROCEDURE — 93454 CORONARY ARTERY ANGIO S&I: CPT

## 2023-08-17 RX ORDER — SODIUM CHLORIDE 9 MG/ML
1000 INJECTION INTRAMUSCULAR; INTRAVENOUS; SUBCUTANEOUS
Refills: 0 | Status: DISCONTINUED | OUTPATIENT
Start: 2023-08-17 | End: 2023-09-01

## 2023-08-17 RX ADMIN — SODIUM CHLORIDE 100 MILLILITER(S): 9 INJECTION INTRAMUSCULAR; INTRAVENOUS; SUBCUTANEOUS at 14:52

## 2023-08-17 NOTE — H&P CARDIOLOGY - HISTORY OF PRESENT ILLNESS
89 F with PMH of HTN, dyslipidemia, DM2, left bundle branch block, vertigo, varicose veins of the legs with circulatory changes presents for LHC. Reports dyspnea on exertion with mild to moderate activities. No chest pain or palpitation. Denies lightheadedness, HA, abdominal pain, N/V, hematuria, BRBPR, melena, syncope, or any other complaints. No implanted cardiac devices. No CP, SOB, palpitation at this time. Patient is now referred by Dr. Grace to Dr. Kam for LHC.     Cards: Dr. Dany Grace  TTE 7/2021: LVEF 60-65%, E/A reversal consistent with reduced LV compliance, mild TR, IN

## 2023-08-17 NOTE — ASU DISCHARGE PLAN (ADULT/PEDIATRIC) - ASU DC SPECIAL INSTRUCTIONSFT

## 2023-08-17 NOTE — ASU PATIENT PROFILE, ADULT - FALL HARM RISK - HARM RISK INTERVENTIONS

## 2023-08-17 NOTE — H&P CARDIOLOGY - NSWEIGHTCALCTOOLDRUG2_GEN_A_CORE
Mariella Valdovinos requires a bedside commode due to being confined to a single room, and is physically incapable of utilizing regular toilet facilities. Current body weight is Weight: 104 lb 8 oz (47.4 kg).   TE  used

## 2023-08-17 NOTE — ASU DISCHARGE PLAN (ADULT/PEDIATRIC) - NS MD DC FALL RISK RISK
For information on Fall & Injury Prevention, visit: [email or web address] OR  [email or web address] OR  https://www.cdc.gov/steadi/patient.html

## 2023-08-17 NOTE — ASU DISCHARGE PLAN (ADULT/PEDIATRIC) - CARE PROVIDER_API CALL
Robert Grace  Cardiovascular Disease  [address] [place] [address]  Phone: [phone]  Fax: [phone]  Established Patient  Follow Up Time:

## 2023-08-17 NOTE — H&P CARDIOLOGY - DATE:
Jesu Turcios is calling to request a refill on the following medication(s):    Medication Request:  Requested Prescriptions     Pending Prescriptions Disp Refills    famotidine (PEPCID) 40 MG tablet [Pharmacy Med Name: FAMOTIDINE 40 MG TABLET] 30 tablet 2     Sig: TAKE ONE TABLET BY MOUTH ONCE NIGHTLY AS NEEDED FOR STOMACH UPSET       Last Visit Date (If Applicable):  7/40/6653    Next Visit Date:    6/17/2021
17-Aug-2023

## 2023-08-18 PROBLEM — E78.5 HYPERLIPIDEMIA, UNSPECIFIED: Chronic | Status: ACTIVE | Noted: 2023-08-17

## 2023-08-18 PROBLEM — R42 DIZZINESS AND GIDDINESS: Chronic | Status: ACTIVE | Noted: 2023-08-17

## 2023-08-23 NOTE — DISCHARGE NOTE PROVIDER - DETAILS OF MALNUTRITION DIAGNOSIS/DIAGNOSES
Other than symptoms associated with present events the following is reported:  General:  No fever, no chills, no weight loss.  HEENT:  No sore throat.  Respiratory: No cough, no dyspnea, no wheeze.  Cardiovascular:  No chest pain, no palpitations, no orthopnea.  GI: No abdominal pain, no nausea/vomiting, no diarrhea.  : No dysuria, no frequency, no urgency.  Musculoskeletal:  No joint pain, no myalgia.  Endocrine:  No generalized weakness, no polyuria.  Neurological:  No headache, no focal weakness.   Psychiatric: No emotional stress, no depression.  Derm:  No rash.  Heme:  No bruising, no bleeding.
This patient has been assessed with a concern for Malnutrition and was treated during this hospitalization for the following Nutrition diagnosis/diagnoses:     -  07/09/2021: Moderate protein-calorie malnutrition

## 2023-09-28 NOTE — PHYSICAL THERAPY INITIAL EVALUATION ADULT - SITTING BALANCE: STATIC

## 2023-10-09 ENCOUNTER — APPOINTMENT (OUTPATIENT)
Dept: CARDIOLOGY | Facility: CLINIC | Age: 88
End: 2023-10-09

## 2024-01-15 ENCOUNTER — EMERGENCY (EMERGENCY)
Facility: HOSPITAL | Age: 89
LOS: 0 days | Discharge: HOME | End: 2024-01-15
Attending: STUDENT IN AN ORGANIZED HEALTH CARE EDUCATION/TRAINING PROGRAM
Payer: MEDICARE

## 2024-01-15 VITALS
OXYGEN SATURATION: 96 % | HEART RATE: 76 BPM | SYSTOLIC BLOOD PRESSURE: 144 MMHG | DIASTOLIC BLOOD PRESSURE: 77 MMHG | HEIGHT: 61 IN | RESPIRATION RATE: 18 BRPM | TEMPERATURE: 98 F | WEIGHT: 139.99 LBS

## 2024-01-15 VITALS
RESPIRATION RATE: 18 BRPM | HEART RATE: 70 BPM | SYSTOLIC BLOOD PRESSURE: 148 MMHG | OXYGEN SATURATION: 95 % | TEMPERATURE: 98 F | DIASTOLIC BLOOD PRESSURE: 73 MMHG

## 2024-01-15 DIAGNOSIS — I10 ESSENTIAL (PRIMARY) HYPERTENSION: ICD-10-CM

## 2024-01-15 DIAGNOSIS — M25.762 OSTEOPHYTE, LEFT KNEE: ICD-10-CM

## 2024-01-15 DIAGNOSIS — M25.562 PAIN IN LEFT KNEE: ICD-10-CM
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
## 2024-01-15 DIAGNOSIS — Z79.82 LONG TERM (CURRENT) USE OF ASPIRIN: ICD-10-CM

## 2024-01-15 DIAGNOSIS — E11.9 TYPE 2 DIABETES MELLITUS WITHOUT COMPLICATIONS: ICD-10-CM
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
## 2024-01-15 DIAGNOSIS — S22.31XA FRACTURE OF ONE RIB, RIGHT SIDE, INITIAL ENCOUNTER FOR CLOSED FRACTURE: ICD-10-CM

## 2024-01-15 DIAGNOSIS — M25.561 PAIN IN RIGHT KNEE: ICD-10-CM

## 2024-01-15 DIAGNOSIS — R07.81 PLEURODYNIA: ICD-10-CM

## 2024-01-15 DIAGNOSIS — Z23 ENCOUNTER FOR IMMUNIZATION: ICD-10-CM

## 2024-01-15 DIAGNOSIS — Z79.84 LONG TERM (CURRENT) USE OF ORAL HYPOGLYCEMIC DRUGS: ICD-10-CM

## 2024-01-15 DIAGNOSIS — I44.7 LEFT BUNDLE-BRANCH BLOCK, UNSPECIFIED: ICD-10-CM

## 2024-01-15 DIAGNOSIS — S80.211A ABRASION, RIGHT KNEE, INITIAL ENCOUNTER: ICD-10-CM

## 2024-01-15 PROCEDURE — 73564 X-RAY EXAM KNEE 4 OR MORE: CPT | Mod: 26,50

## 2024-01-15 PROCEDURE — 70450 CT HEAD/BRAIN W/O DYE: CPT | Mod: 26,MA

## 2024-01-15 PROCEDURE — 99285 EMERGENCY DEPT VISIT HI MDM: CPT

## 2024-01-15 PROCEDURE — 70486 CT MAXILLOFACIAL W/O DYE: CPT | Mod: 26,MA

## 2024-01-15 PROCEDURE — 71250 CT THORAX DX C-: CPT | Mod: 26,MA

## 2024-01-15 PROCEDURE — 72125 CT NECK SPINE W/O DYE: CPT | Mod: 26,MA

## 2024-01-15 RX ORDER — LOSARTAN POTASSIUM 100 MG/1
1 TABLET, FILM COATED ORAL
Qty: 0 | Refills: 0 | DISCHARGE

## 2024-01-15 RX ORDER — METOPROLOL TARTRATE 50 MG
50 TABLET ORAL
Qty: 0 | Refills: 0 | DISCHARGE

## 2024-01-15 RX ORDER — IBUPROFEN 200 MG
1 TABLET ORAL
Qty: 16 | Refills: 0
Start: 2024-01-15 | End: 2024-01-18

## 2024-01-15 RX ORDER — IBUPROFEN 200 MG
400 TABLET ORAL ONCE
Refills: 0 | Status: COMPLETED | OUTPATIENT
Start: 2024-01-15 | End: 2024-01-15

## 2024-01-15 RX ORDER — LIDOCAINE 4 G/100G
1 CREAM TOPICAL ONCE
Refills: 0 | Status: COMPLETED | OUTPATIENT
Start: 2024-01-15 | End: 2024-01-15

## 2024-01-15 RX ORDER — LIDOCAINE 4 G/100G
1 CREAM TOPICAL
Qty: 1 | Refills: 0
Start: 2024-01-15 | End: 2024-01-19

## 2024-01-15 RX ORDER — REPAGLINIDE 1 MG/1
1 TABLET ORAL
Qty: 0 | Refills: 0 | DISCHARGE

## 2024-01-15 RX ORDER — ACETAMINOPHEN 500 MG
650 TABLET ORAL ONCE
Refills: 0 | Status: COMPLETED | OUTPATIENT
Start: 2024-01-15 | End: 2024-01-15

## 2024-01-15 RX ORDER — TETANUS TOXOID, REDUCED DIPHTHERIA TOXOID AND ACELLULAR PERTUSSIS VACCINE, ADSORBED 5; 2.5; 8; 8; 2.5 [IU]/.5ML; [IU]/.5ML; UG/.5ML; UG/.5ML; UG/.5ML
0.5 SUSPENSION INTRAMUSCULAR ONCE
Refills: 0 | Status: COMPLETED | OUTPATIENT
Start: 2024-01-15 | End: 2024-01-15

## 2024-01-15 RX ORDER — ASPIRIN/CALCIUM CARB/MAGNESIUM 324 MG
1 TABLET ORAL
Refills: 0 | DISCHARGE

## 2024-01-15 RX ORDER — ATORVASTATIN CALCIUM 80 MG/1
1 TABLET, FILM COATED ORAL
Qty: 0 | Refills: 0 | DISCHARGE

## 2024-01-15 RX ADMIN — Medication 650 MILLIGRAM(S): at 10:06

## 2024-01-15 RX ADMIN — Medication 650 MILLIGRAM(S): at 11:06

## 2024-01-15 RX ADMIN — Medication 400 MILLIGRAM(S): at 13:57

## 2024-01-15 RX ADMIN — TETANUS TOXOID, REDUCED DIPHTHERIA TOXOID AND ACELLULAR PERTUSSIS VACCINE, ADSORBED 0.5 MILLILITER(S): 5; 2.5; 8; 8; 2.5 SUSPENSION INTRAMUSCULAR at 10:07

## 2024-01-15 RX ADMIN — LIDOCAINE 1 PATCH: 4 CREAM TOPICAL at 10:06

## 2024-01-15 NOTE — ED PROVIDER NOTE - PATIENT PORTAL LINK FT
You can access the FollowMyHealth Patient Portal offered by [facility] by registering at the following website: [email or web address]. By joining [employer]’s FollowMyHealth portal, you will also be able to view your health information using other applications (apps) compatible with our system. You can access the FollowMyHealth Patient Portal offered by [facility] by registering at the following website: [email or web address]. By joining [employer]’s FollowMyHealth portal, you will also be able to view your health information using other applications (apps) compatible with our system. You can access the FollowMyHealth Patient Portal offered by [facility] by registering at the following website: [email or web address]. By joining [employer]’s FollowMyHealth portal, you will also be able to view your health information using other applications (apps) compatible with our system.

## 2024-01-15 NOTE — ED PROVIDER NOTE - CARE PROVIDERS DIRECT ADDRESSES
,[email or web address]

## 2024-01-15 NOTE — ED PROVIDER NOTE - NSFOLLOWUPINSTRUCTIONS_ED_ALL_ED_FT
Rest, drink plenty of fluids.  Advance activity as tolerated.  Continue all previously prescribed medications as directed.  Follow up with your primary care physician in 48-72 hours- bring copies of your results.  Return to the ER for worsening or persistent symptoms, and/or ANY NEW OR CONCERNING SYMPTOMS. If you have issues obtaining follow up, please call: [phone] to obtain a doctor or specialist who takes your insurance in your area.  You may call [phone] to make an appointment with the internal medicine clinic. Rest, drink plenty of fluids.  Advance activity as tolerated.  Continue all previously prescribed medications as directed.  Follow up with your primary care physician in 48-72 hours- bring copies of your results.  Return to the ER for worsening or persistent symptoms, and/or ANY NEW OR CONCERNING SYMPTOMS. If you have issues obtaining follow up, please call: [phone] to obtain a doctor or specialist who takes your insurance in your area.  You may call [phone] to make an appointment with the internal medicine clinic. Rest, drink plenty of fluids.  Advance activity as tolerated.  Continue all previously prescribed medications as directed.  Follow up with your primary care physician in 48-72 hours- bring copies of your results.  Return to the ER for worsening or persistent symptoms, and/or ANY NEW OR CONCERNING SYMPTOMS. If you have issues obtaining follow up, please call: [phone] to obtain a doctor or specialist who takes your insurance in your area.  You may call [phone] to make an appointment with the internal medicine clinic.

## 2024-01-15 NOTE — ED PROVIDER NOTE - PHYSICAL EXAMINATION
GEN: Awake, alert, interactive, NAD.  HEAD AND NECK: NC/AT. Airway patent. Neck supple.   EYES:  Clear b/l. EOMI. PERRL. Right temporal bruising and abrasion.   ENT: Moist mucus membranes.   CARDIAC: Regular rate, regular rhythm. No evident pedal edema.    RESP/CHEST: Normal respiratory effort with no use of accessory muscles or retractions. Clear throughout on auscultation. (+) mild tenderness to the anterior mid rib. No crepitus.   ABD: soft, non-distended, non-tender. No rebound, no guarding.   BACK: No midline spinal TTP. No CVAT.   EXTREMITIES: RLE: (+) abrasion  to the right knee, (+) pain with range of motion of the knee with limited range of motion, (+) FROM of the hip, ankle, toes, (+) toes. LLE: No deformity, (-) tenderness, (-) Edema, (+) FROM of the left knee, ankle, and toes.  Moving all other extremities with no apparent deformities.   SKIN: Warm, dry, intact normal color. No rash.   NEURO: AOx3, CN II-XII grossly intact, no focal deficits.   PSYCH: Appropriate mood and affect.

## 2024-01-15 NOTE — ED PROVIDER NOTE - NS ED ROS FT
CONSTITUTIONAL: No fever, no chills, no fatigue  EYES: No visual changes  ENT: No ear pain, no sore throat  CARDIOVASCULAR:  no palpitations  RESPIRATORY: No cough, no SOB  GI: No abdominal pain, no nausea, no vomiting, no constipation, no diarrhea  GENITOURINARY: No dysuria, no frequency, no hematuria  MUSKULOSKELETAL: No backpain.   SKIN: No rash  NEURO: No headache    ALL OTHER SYSTEMS NEGATIVE.

## 2024-01-15 NOTE — ED PROVIDER NOTE - CARE PROVIDER_API CALL
Neftali Oquendo  Orthopaedic Surgery  [address], [address]  [place] [address]  Phone: [phone]  Fax: [phone]  Follow Up Time: 4-6 Days   Neftali Oquendo  Orthopaedic Surgery  [address], [address]  [place] [address]  Phone: [phone]  Fax: [phone]  Follow Up Time: 4-6 Days   Neftali Oquendo  Orthopaedic Surgery  [address], [address]  [place] [address]  Phone: [phone]  Fax: [phone]  Follow Up Time: 4-6 Days

## 2024-01-15 NOTE — ED PROVIDER NOTE - CARE PLAN
1 Principal Discharge DX:	Fracture of one rib of right side  Secondary Diagnosis:	Right knee injury

## 2024-01-15 NOTE — ED ADULT NURSE NOTE - OBJECTIVE STATEMENT
Pt presents to the ed s/p trip and fall yesterday. Pt c/o right knee pain, right rib area pain and right of face pain. Pt was noted to have abrasion to right knee, ecchymosis and small laceration to right side of face and tenderness to palpation to right rib area. pt was noted to have rib pain with movement. No crepitus was noted on palpation of face. Pt has equal pedal push. Pt denies LOC, dizziness or blurry vision.  Pt was noted to have crackles in B/l upper and lower lobes. Pt denies chest pain. Pt was given an incentive spirometer and showed how to use such. pt states she takes baby aspirin daily last use was 2 days ago. Last tetanus is unknown.

## 2024-01-15 NOTE — ED ADULT TRIAGE NOTE - CHIEF COMPLAINT QUOTE
pt states she tripped and fell last night. hit her head on the concrete. noted right side head small laceration and bruising and right knee abrasion. c/o right side body pain. no loc. pt takes asprin daily. history of htn and dm

## 2024-01-15 NOTE — ED PROVIDER NOTE - CLINICAL SUMMARY MEDICAL DECISION MAKING FREE TEXT BOX
89 y/o female with dm, htn, LBBB here with mechanical fall yesterday with right sided rib pain and bilateral knee pain R>L. Vs stable.   Will obtain imaging r/o bleed or fracture. Will treat with tylenol lidocaine patch and give tetanus 89 y/o female with dm, htn, LBBB here with mechanical fall yesterday with right sided rib pain and bilateral knee pain R>L. Vs stable.   Will obtain imaging r/o bleed or fracture. Will treat with tylenol lidocaine patch and give tetanus 89 y/o female with dm, htn, LBBB here with mechanical fall yesterday with right sided rib pain and bilateral knee pain R>L. Vs stable.   Will obtain imaging r/o bleed or fracture. Will treat with tylenol lidocaine patch and give tetanus    Ct head/ ct c spine/ct facial no acute findings.  Ct chest:Minimal cortical buckling of the anterior aspect  of the   right fifth rib may represent nondisplaced acute fracture. Correlation   with focal tenderness and site of trauma is recommended for further   evaluation.    A few right lung clusters of tree-in-bud opacities or foci of impacted   distal airways may be due to mucus or may be of infectious etiology. A 3   month follow-up noncontrast chest CT is recommended to ensure stability   or resolution.  XR knee: Right knee shows some chondrocalcinosis. Narrowing of the medial   compartment with medial femoral condyle steeling right. No acute fracture   or dislocation. Patellofemoral joint space narrowing with patellar   osteophyte. Small suprapatellar effusion. Osteopenia suggested.    Left knee shows some narrowing of the medial compartment with some medial   and lateral condylar osteophytes. No acute fracture or dislocation.   Patellofemoral joint narrowing. Patellar osteophyte. No effusion.   Osteopenia.  Ct chest shows right fifth rib fracture. Pt also made aware of the lung findings and advised to follow up with PMD. Pt denies fever or recent illnesses. Incentive spirometry given.   Pt reassessed and reports feeling better. Ambulatory with assistance of the daughter. Pt states she wants to be discharged with daughter and does not want to be admitted for PT/short term rehab evaluation. Shared decision making.   Pt advised to return if symptoms worsen.   PT advised to RICE. Rx motrin and lidocaine patch. 89 y/o female with dm, htn, LBBB here with mechanical fall yesterday with right sided rib pain and bilateral knee pain R>L. Vs stable.   Will obtain imaging r/o bleed or fracture. Will treat with tylenol lidocaine patch and give tetanus    Ct head/ ct c spine/ct facial no acute findings.  Ct chest:Minimal cortical buckling of the anterior aspect  of the   right fifth rib may represent nondisplaced acute fracture. Correlation   with focal tenderness and site of trauma is recommended for further   evaluation.    A few right lung clusters of tree-in-bud opacities or foci of impacted   distal airways may be due to mucus or may be of infectious etiology. A 3   month follow-up noncontrast chest CT is recommended to ensure stability   or resolution.  XR knee: Right knee shows some chondrocalcinosis. Narrowing of the medial   compartment with medial femoral condyle steeling right. No acute fracture   or dislocation. Patellofemoral joint space narrowing with patellar   osteophyte. Small suprapatellar effusion. Osteopenia suggested.    Left knee shows some narrowing of the medial compartment with some medial   and lateral condylar osteophytes. No acute fracture or dislocation.   Patellofemoral joint narrowing. Patellar osteophyte. No effusion.   Osteopenia.  Ct chest shows right fifth rib fracture. Pt also made aware of the lung findings and advised to follow up with PMD. Pt denies fever or recent illnesses. Incentive spirometry given.   Pt reassessed and reports feeling better. Ambulatory with assistance of the daughter. Pt states she wants to be discharged with daughter and does not want to be admitted for PT/short term rehab evaluation. Shared decision making.   Pt advised to return if symptoms worsen.   PT advised to RICE. Rx motrin and lidocaine patch.

## 2024-01-15 NOTE — ED ADULT NURSE NOTE - NSFALLHARMRISKINTERV_ED_ALL_ED
Communicate risk of Fall with Harm to all staff, patient, and family/Provide visual cue: red socks, yellow wristband, yellow gown, etc/Reinforce activity limits and safety measures with patient and family/Bed in lowest position, wheels locked, appropriate side rails in place/Call bell, personal items and telephone in reach/Instruct patient to call for assistance before getting out of bed/chair/stretcher/Non-slip footwear applied when patient is off stretcher/[place] to call system/Physically safe environment - no spills, clutter or unnecessary equipment/Purposeful Proactive Rounding/Room/bathroom lighting operational, light cord in reach Communicate risk of Fall with Harm to all staff, patient, and family/Provide visual cue: red socks, yellow wristband, yellow gown, etc/Reinforce activity limits and safety measures with patient and family/Bed in lowest position, wheels locked, appropriate side rails in place/Call bell, personal items and telephone in reach/Instruct patient to call for assistance before getting out of bed/chair/stretcher/Non-slip footwear applied when patient is off stretcher/[place] to call system/Physically safe environment - no spills, clutter or unnecessary equipment/Purposeful Proactive Rounding/Room/bathroom lighting operational, light cord in reach Communicate risk of Fall with Harm to all staff, patient, and family/Provide visual cue: red socks, yellow wristband, yellow gown, etc/Reinforce activity limits and safety measures with patient and family/Bed in lowest position, wheels locked, appropriate side rails in place/Call bell, personal items and telephone in reach/Instruct patient to call for assistance before getting out of bed/chair/stretcher/Non-slip footwear applied when patient is off stretcher/[place] to call system/Physically safe environment - no spills, clutter or unnecessary equipment/Purposeful Proactive Rounding/Room/bathroom lighting operational, light cord in reach

## 2024-01-15 NOTE — ED PROVIDER NOTE - OBJECTIVE STATEMENT
89 y/o female with dm, htn, LBBB presents with fall yesterday. Pt states her shoe got stuck on the ground and she fell forward on the right side hitting her head. Denies LOC. Pt was able to get up and ambulate afterwards but with pain in her right knee. Pt denies headahce, dizziness, vomiting, abdominal pain, chest pain, dyspnea. Pt reports having bilateral knee pain R>L and right sided rib pain. Denies dyspnea, numbness, tingling, weakness. Pt is on asa. Last tetanus unknown. 89 y/o female with dm, htn, LBBB presents with fall yesterday. Pt states her shoe got stuck on the ground and she fell forward on the right side hitting her head. Denies LOC. Pt was able to get up and ambulate afterwards but with pain in her right knee. Pt denies headahce, dizziness, vomiting, abdominal pain, chest pain, dyspnea. Pt reports having bilateral knee pain R>L and right sided rib pain. Denies dyspnea, numbness, tingling, weakness. Pt is on asa. Last tetanus unknown.

## 2024-01-15 NOTE — ED PROVIDER NOTE - PROVIDER TOKENS
PROVIDER:[TOKEN:[[id number]],FOLLOWUP:[4-6 Days]] PROVIDER:[TOKEN:[[id number]],FOLLOWUP:[4-6 Days]] PROVIDER:[TOKEN:[[id number]],FOLLOWUP:[4-6 Days]]

## 2024-01-17 ENCOUNTER — APPOINTMENT (OUTPATIENT)
Dept: ORTHOPEDIC SURGERY | Facility: CLINIC | Age: 89
End: 2024-01-17
Payer: MEDICARE

## 2024-01-17 VITALS — BODY MASS INDEX: 24.92 KG/M2 | WEIGHT: 146 LBS | HEIGHT: 64 IN

## 2024-01-17 PROCEDURE — 20610 DRAIN/INJ JOINT/BURSA W/O US: CPT | Mod: RT

## 2024-01-17 PROCEDURE — 99203 OFFICE O/P NEW LOW 30 MIN: CPT | Mod: 25

## 2024-01-17 NOTE — IMAGING
[Right] : right knee [All Views] : anteroposterior, lateral, skyline, and anteroposterior standing [Moderate tricompartmental OA medial narrowing] : Moderate tricompartmental OA medial narrowing [Moderate tricompartmental OA lateral narrowing] : Moderate tricompartmental OA lateral narrowing [de-identified] :   ----------------------------------------------------------------------------   Right knee exam:   Skin: multiple abrasions, healing with scabs  Inspection:      (+moderate) Effusion     (neg) Malalignment     (neg) Swelling     (neg) Quad atrophy     (neg) J-sign  ROM:     0 - 125 degrees of flexion. with grinding  Tenderness: +distal pull patella     (neg) MJLT     (+) LJLT     (neg) Medial patellar facet tenderness     (neg) Lateral patellar facet tenderness     (neg) Crepitus     (neg) Patellar grind tenderness     (+) Patellar tendon     (neg) Quad tendon     Other:  Stability:     (neg) Lachman     (neg) Varus/Valgus instability     (neg) Posterior drawer     (neg) Patellar translation: wnl  Additional tests:     (neg) McMurrays test     (neg) Patellar apprehension     Other:  Strength: 5/5 Q/H/TA/GS/EHL  Neuro: In tact to light touch throughout, DTR's wnl  Vascularity: Extremity warm and well perfused  Gait: normal    [FreeTextEntry9] : moderate effusion, chondral calcinosis

## 2024-01-17 NOTE — HISTORY OF PRESENT ILLNESS
[7] : 7 [6] : 6 [Dull/Aching] : dull/aching [Localized] : localized [Throbbing] : throbbing [de-identified] : This is Ms. HEIDI SORENSON  a 89 year old female who comes in today complaining of right knee pain after falling sunday 1/14/24. Went to hospital, was given ibuprofen 400mg, has been using walker/holden chair since. [] : no [FreeTextEntry5] : Here for evaluation on the RIGHT knee. Stated she tripped over her feet and fell  01/14/24. Went to the ER on 01/15/24, Stated no fracture on her knee. Slight fluid. Pain is located anterior. Unable to bear weight. ROM limited.  h/o of broken patella [de-identified] : 01/15/24 [de-identified] : ER  [de-identified] : XR

## 2024-01-17 NOTE — DISCUSSION/SUMMARY
[de-identified] : Discussed PT, nsaids, [facility] Therapy aspiration right knee - sterilly aspirated 35cc blood rx: hkb locked in extension / knee immobilizer f/u 3-4 w ----------------------------------------------------------------------------  All relevant imaging studies pertinent to today's visit, including x-rays, MRI's and/or other advanced imaging studies (CT/etc) were independently interpreted and reviewed with the patient as needed. Implications of the studies together with the patient's clinical picture were discussed to formulate a working diagnosis and management options were detailed.  The patient was advised of the diagnosis.  The natural history of the pathology was explained in full. All questions were answered.  The risks and benefits of conservative and interventional treatment alternatives were explained to the patient

## 2024-01-25 ENCOUNTER — APPOINTMENT (OUTPATIENT)
Dept: PULMONOLOGY | Facility: CLINIC | Age: 89
End: 2024-01-25
Payer: MEDICARE

## 2024-01-25 VITALS
BODY MASS INDEX: 23.73 KG/M2 | RESPIRATION RATE: 15 BRPM | TEMPERATURE: 97.8 F | OXYGEN SATURATION: 93 % | SYSTOLIC BLOOD PRESSURE: 156 MMHG | DIASTOLIC BLOOD PRESSURE: 70 MMHG | HEART RATE: 72 BPM | HEIGHT: 64 IN | WEIGHT: 139 LBS

## 2024-01-25 DIAGNOSIS — R06.02 SHORTNESS OF BREATH: ICD-10-CM

## 2024-01-25 PROCEDURE — 99203 OFFICE O/P NEW LOW 30 MIN: CPT

## 2024-01-25 NOTE — PHYSICAL EXAM
[No Acute Distress] : no acute distress [Normal Appearance] : normal appearance [Normal Rate/Rhythm] : normal rate/rhythm [No Resp Distress] : no resp distress [Oriented x3] : oriented x3 [TextBox_54] : 2/6 systolic regurgitant murmur left sternal border [TextBox_68] : Crackles right lung base [TextBox_99] : Wheelchair

## 2024-01-25 NOTE — HISTORY OF PRESENT ILLNESS
[TextBox_4] : 89-year-old female who was found to have an abnormal chest CT.  Unfortunately she fell as she was getting off a bus and injured her leg her chest and her head.  She comes in today with a knee brace having had her knee drained of fluid.  Her chest CT demonstrated several areas of tree-in-bud, calcified granuloma, chronic middle lobe atelectasis and scarring in both apices.  Her CT of her head did not show any evidence of traumatic lesions.  The patient is a non-smoker but admits to having had pneumonia as a teenager in [place].  She does admit to dyspnea on exertion and underwent a cardiac evaluation including echocardiograms and cardiac catheterizations without any significant findings.  She denies any significant coughing, chest discomfort, wheezing or peripheral edema.

## 2024-01-25 NOTE — DISCUSSION/SUMMARY
[FreeTextEntry1] : I reviewed her CT of her chest and there are definitely some chronic issues such as the middle lobe atelectasis which was noted more than 10 years ago.  There is scarring at both apices, a calcified granuloma in the left lower lobe and several areas of tree-in-bud..  There is a fractured rib It is unlikely that any of these areas are significant.  Is quite possible that the CAT scan could have been affected by the recent rib fracture.  I suggested to the patient that she should recover from her fall and when she is up and about that she should make an appointment to come back for lung function studies and further evaluation to see if we can determine why she has dyspnea.

## 2024-02-07 ENCOUNTER — APPOINTMENT (OUTPATIENT)
Dept: ORTHOPEDIC SURGERY | Facility: CLINIC | Age: 89
End: 2024-02-07
Payer: MEDICARE

## 2024-02-07 VITALS — WEIGHT: 139 LBS | BODY MASS INDEX: 23.73 KG/M2 | HEIGHT: 64 IN

## 2024-02-07 DIAGNOSIS — S89.91XA UNSPECIFIED INJURY OF RIGHT LOWER LEG, INITIAL ENCOUNTER: ICD-10-CM

## 2024-02-07 DIAGNOSIS — M25.461 EFFUSION, RIGHT KNEE: ICD-10-CM

## 2024-02-07 DIAGNOSIS — M17.11 UNILATERAL PRIMARY OSTEOARTHRITIS, RIGHT KNEE: ICD-10-CM

## 2024-02-07 PROCEDURE — 99214 OFFICE O/P EST MOD 30 MIN: CPT

## 2024-02-07 PROCEDURE — 73562 X-RAY EXAM OF KNEE 3: CPT | Mod: RT

## 2024-02-07 RX ORDER — NAPROXEN 500 MG/1
500 TABLET ORAL
Qty: 30 | Refills: 0 | Status: ACTIVE | COMMUNITY
Start: 2024-02-07 | End: 1900-01-01

## 2024-02-07 NOTE — DATA REVIEWED
[CT Scan] : CT scan [Cervical Spine] : cervical spine [I independently reviewed and interpreted images and report] : I independently reviewed and interpreted images and report [FreeTextEntry1] : chest 1/15/24:  rib fx right 5th rib [FreeTextEntry2] : 1/15/24: multi level rosales

## 2024-02-07 NOTE — DISCUSSION/SUMMARY
[de-identified] : Discussed PT, [facility] Home PT naproxen 500mg BID prn f/u 64weeks ----------------------------------------------------------------------------  Patient warned of specific risks of medication related to bleeding, GI issues, increase blood pressure, and cardiac risks in addition to additional risks.  Patient advised to discuss with PMD  if any presence of stated issues.  ----------------------------------------------------------------------------  All relevant imaging studies pertinent to today's visit, including x-rays, MRI's and/or other advanced imaging studies (CT/etc) were independently interpreted and reviewed with the patient as needed. Implications of the studies together with the patient's clinical picture were discussed to formulate a working diagnosis and management options were detailed.  The patient was advised of the diagnosis.  The natural history of the pathology was explained in full. All questions were answered.  The risks and benefits of conservative and interventional treatment alternatives were explained to the patient

## 2024-02-07 NOTE — HISTORY OF PRESENT ILLNESS
[7] : 7 [6] : 6 [Dull/Aching] : dull/aching [Localized] : localized [Throbbing] : throbbing [de-identified] : This is Ms. HEIDI SORENSON  a 89 year old female who comes in today complaining of right knee pain after falling sunday 1/14/24. Went to hospital, was given ibuprofen 400mg, has been using walker/holden chair since. [] : no [FreeTextEntry5] : Here for evaluation on the RIGHT knee. Stated she tripped over her feet and fell  01/14/24. Went to the ER on 01/15/24, Stated no fracture on her knee. Slight fluid. Pain is located anterior. Unable to bear weight. ROM limited.  h/o of broken patella [de-identified] : 01/15/24 [de-identified] : ER  [de-identified] : XR

## 2024-02-07 NOTE — IMAGING
[Right] : right knee [All Views] : anteroposterior, lateral, skyline, and anteroposterior standing [Moderate tricompartmental OA medial narrowing] : Moderate tricompartmental OA medial narrowing [Moderate tricompartmental OA lateral narrowing] : Moderate tricompartmental OA lateral narrowing [de-identified] :   ----------------------------------------------------------------------------   Right knee exam:   Skin: multiple abrasions, healing with scabs  Inspection:      (+moderate) Effusion     (neg) Malalignment     (neg) Swelling     (neg) Quad atrophy     (neg) J-sign  ROM:     0 - 125 degrees of flexion. with grinding  Tenderness: +distal pull patella     (neg) MJLT     (+) LJLT     (neg) Medial patellar facet tenderness     (neg) Lateral patellar facet tenderness     (neg) Crepitus     (neg) Patellar grind tenderness     (+) Patellar tendon     (neg) Quad tendon     Other:  Stability:     (neg) Lachman     (neg) Varus/Valgus instability     (neg) Posterior drawer     (neg) Patellar translation: wnl  Additional tests:     (neg) McMurrays test     (neg) Patellar apprehension     Other:  Strength: 5/5 Q/H/TA/GS/EHL  Neuro: In tact to light touch throughout, DTR's wnl  Vascularity: Extremity warm and well perfused  Gait: normal    [FreeTextEntry9] : moderate effusion, chondral calcinosis

## 2024-03-13 ENCOUNTER — APPOINTMENT (OUTPATIENT)
Dept: ORTHOPEDIC SURGERY | Facility: CLINIC | Age: 89
End: 2024-03-13
Payer: MEDICARE

## 2024-03-13 VITALS — HEIGHT: 64 IN | BODY MASS INDEX: 23.73 KG/M2 | WEIGHT: 139 LBS

## 2024-03-13 DIAGNOSIS — M17.12 UNILATERAL PRIMARY OSTEOARTHRITIS, LEFT KNEE: ICD-10-CM

## 2024-03-13 DIAGNOSIS — M17.11 UNILATERAL PRIMARY OSTEOARTHRITIS, RIGHT KNEE: ICD-10-CM

## 2024-03-13 PROCEDURE — 20610 DRAIN/INJ JOINT/BURSA W/O US: CPT | Mod: LT

## 2024-03-13 PROCEDURE — 99214 OFFICE O/P EST MOD 30 MIN: CPT | Mod: 25

## 2024-03-13 RX ORDER — DICLOFENAC SODIUM 1% 10 MG/G
1 GEL TOPICAL
Qty: 1 | Refills: 1 | Status: ACTIVE | COMMUNITY
Start: 2024-03-13 | End: 1900-01-01

## 2024-03-13 NOTE — HISTORY OF PRESENT ILLNESS
[7] : 7 [6] : 6 [Dull/Aching] : dull/aching [Throbbing] : throbbing [Localized] : localized [de-identified] : This is Ms. HEIDI SORENSON  a 89 year old female who comes in today complaining of right knee pain after falling sunday 1/14/24. Went to hospital, was given ibuprofen 400mg, has been using walker/holden chair since. [] : no [FreeTextEntry5] : Here for evaluation on the RIGHT knee. Stated she tripped over her feet and fell  01/14/24. Went to the ER on 01/15/24, Stated no fracture on her knee. Slight fluid. Pain is located anterior. Unable to bear weight. ROM limited.  h/o of broken patella [de-identified] : 01/15/24 [de-identified] : ER  [de-identified] : XR  [de-identified] : physical therapy

## 2024-03-13 NOTE — DISCUSSION/SUMMARY
[de-identified] : plan for csi right knee today discused HA continued home PT fu 6w ----------------------------------------------------------------------------  Patient warned of specific risks of medication related to bleeding, GI issues, increase blood pressure, and cardiac risks in addition to additional risks.  Patient advised to discuss with PMD  if any presence of stated issues.  ----------------------------------------------------------------------------  All relevant imaging studies pertinent to today's visit, including x-rays, MRI's and/or other advanced imaging studies (CT/etc) were independently interpreted and reviewed with the patient as needed. Implications of the studies together with the patient's clinical picture were discussed to formulate a working diagnosis and management options were detailed.  The patient was advised of the diagnosis.  The natural history of the pathology was explained in full. All questions were answered.  The risks and benefits of conservative and interventional treatment alternatives were explained to the patient   ----------------------------------------------------------------------------  Large joint corticosteroid injection given: Left knee  Patient indicated for injection after trial of rest, OTC medications including aspirin, Ibuprofen, Aleve etc or prescription NSAIDS, and/or exercises at home and/ or physical therapy without satisfactory response.  Patient has symptoms including pain, swelling, and/or decreased mobility in the joint. The risks, benefits, and alternatives to corticosteroid injection were explained in full to the patient, including but not limited to infection, sepsis, bleeding, scarring, skin discoloration, temporary increase in pain, syncopal episode, failure to resolve symptoms, allergic reaction, symptom recurrence, and elevation of blood sugar in diabetics. Patient understood the risks. All questions were answered. After discussion of options, patient requested an injection.   Oral informed consent was obtained and sterile technique was utilized for the procedure including the preparation of the solutions used for the injection and betadine followed by alcohol prep to the injection site. Anesthesia was given with ethyl chloride sprayed topically. The injection was delivered. Patient tolerated the procedure well.   Post Procedure Instructions: Patient was advised to call if redness, pain, or fever occur and apply ice for 15 min on and 15 min off later today  Medications delivered: Kenalog 10 mg, Lidocaine: 4 cc

## 2024-03-13 NOTE — IMAGING
[Right] : right knee [Moderate tricompartmental OA medial narrowing] : Moderate tricompartmental OA medial narrowing [All Views] : anteroposterior, lateral, skyline, and anteroposterior standing [Moderate tricompartmental OA lateral narrowing] : Moderate tricompartmental OA lateral narrowing [de-identified] :   ----------------------------------------------------------------------------   Right knee exam:   Skin: multiple abrasions, healing with scabs  Inspection:      (+moderate) Effusion     (neg) Malalignment     (neg) Swelling     (neg) Quad atrophy     (neg) J-sign  ROM:     0 - 125 degrees of flexion. with grinding  Tenderness: +distal pull patella     (neg) MJLT     (+) LJLT     (neg) Medial patellar facet tenderness     (neg) Lateral patellar facet tenderness     (neg) Crepitus     (neg) Patellar grind tenderness     (+) Patellar tendon     (neg) Quad tendon     Other:  Stability:     (neg) Lachman     (neg) Varus/Valgus instability     (neg) Posterior drawer     (neg) Patellar translation: wnl  Additional tests:     (neg) McMurrays test     (neg) Patellar apprehension     Other:  Strength: 5/5 Q/H/TA/GS/EHL  Neuro: In tact to light touch throughout, DTR's wnl  Vascularity: Extremity warm and well perfused  Gait: normal    [FreeTextEntry9] : moderate effusion, chondral calcinosis

## 2024-03-21 ENCOUNTER — APPOINTMENT (OUTPATIENT)
Dept: PULMONOLOGY | Facility: CLINIC | Age: 89
End: 2024-03-21
Payer: MEDICARE

## 2024-03-21 VITALS
BODY MASS INDEX: 24.41 KG/M2 | HEIGHT: 64 IN | SYSTOLIC BLOOD PRESSURE: 194 MMHG | OXYGEN SATURATION: 94 % | DIASTOLIC BLOOD PRESSURE: 71 MMHG | TEMPERATURE: 97.8 F | WEIGHT: 143 LBS | HEART RATE: 70 BPM

## 2024-03-21 DIAGNOSIS — J47.9 BRONCHIECTASIS, UNCOMPLICATED: ICD-10-CM

## 2024-03-21 DIAGNOSIS — R93.89 ABNORMAL FINDINGS ON DIAGNOSTIC IMAGING OF OTHER SPECIFIED BODY STRUCTURES: ICD-10-CM

## 2024-03-21 PROCEDURE — 99214 OFFICE O/P EST MOD 30 MIN: CPT

## 2024-03-21 RX ORDER — BUDESONIDE AND FORMOTEROL FUMARATE DIHYDRATE 160; 4.5 UG/1; UG/1
160-4.5 AEROSOL RESPIRATORY (INHALATION)
Qty: 1 | Refills: 11 | Status: ACTIVE | COMMUNITY
Start: 2024-03-21 | End: 1900-01-01

## 2024-03-21 NOTE — ASSESSMENT
[FreeTextEntry1] : The cough is likely related to bronchiolectasis and mucus accumulation in the lung as a result of prior infection.  I suggested a trial of Symbicort 2 inhlations twice a day.  I provided her with an AeroChamber and I described its use.  I also suggested a nasal spray to be used 2 sprays twice a day.  I asked her to do this for least 6 weeks and if she is improved to try to stop the medication and see if it the improvement lasting if not to return to use of the medication.  If no better after 6 weeks she should return for follow-up. After walking more than 300 feet, she maintained her oxygen saturations and her heart rate remained at 100.

## 2024-03-21 NOTE — HISTORY OF PRESENT ILLNESS
[TextBox_4] : 89-year-old female with a chronic productive cough for a number of years.  A chest scan done after she had a fall I demonstrated several areas of "tree-in-bud", a calcified granuloma, chronic middle lobe atelectasis and scarring in both apices.  Cough occurs throughout the day and occasionally is productive of clear sputum.  Has not been associated with any prior fevers.  She does complain of dyspnea on exertion but her cardiac catheterization was rather unremarkable.  She does have mitral valve disease, concentric hypertrophy of the left ventricle and an enlarged left atrium suggestive of diastolic dysfunction.  She also reports having a serious pneumonia as a teenager in [place] which is likely responsible for a lot of the scarring in her lung. She is a lifelong non-smoker and denies any dysphagia

## 2024-03-21 NOTE — REASON FOR VISIT
[Follow-Up] : a follow-up visit [Abnormal CXR/ Chest CT] : an abnormal CXR/ chest CT [Cough] : cough [Family Member] : family member

## 2024-03-21 NOTE — PHYSICAL EXAM
[Normal Appearance] : normal appearance [No Acute Distress] : no acute distress [Normal Rate/Rhythm] : normal rate/rhythm [No Resp Distress] : no resp distress [No Edema] : no edema [Oriented x3] : oriented x3 [TextBox_54] : Widely split second sound [TextBox_68] : Coarse crackles and rhonchi throughout both lungs

## 2024-04-24 ENCOUNTER — APPOINTMENT (OUTPATIENT)
Dept: ORTHOPEDIC SURGERY | Facility: CLINIC | Age: 89
End: 2024-04-24

## 2024-06-27 ENCOUNTER — RX CHANGE (OUTPATIENT)
Age: 89
End: 2024-06-27

## 2024-09-27 NOTE — ED PROVIDER NOTE - FALL OUT OF, OFF OF, THROUGH
Quality 226: Preventive Care And Screening: Tobacco Use: Screening And Cessation Intervention: Patient screened for tobacco use and is an ex/non-smoker Detail Level: Zone cement
